# Patient Record
Sex: MALE | Race: BLACK OR AFRICAN AMERICAN | Employment: FULL TIME | URBAN - NONMETROPOLITAN AREA
[De-identification: names, ages, dates, MRNs, and addresses within clinical notes are randomized per-mention and may not be internally consistent; named-entity substitution may affect disease eponyms.]

---

## 2020-10-29 ENCOUNTER — APPOINTMENT (OUTPATIENT)
Dept: CT IMAGING | Age: 40
End: 2020-10-29
Attending: EMERGENCY MEDICINE

## 2020-10-29 ENCOUNTER — HOSPITAL ENCOUNTER (EMERGENCY)
Age: 40
Discharge: HOME | End: 2020-10-29
Attending: EMERGENCY MEDICINE

## 2020-10-29 ENCOUNTER — HOSPITAL ENCOUNTER (INPATIENT)
Age: 40
LOS: 2 days | Discharge: HOME | DRG: 440 | End: 2020-11-01
Attending: EMERGENCY MEDICINE | Admitting: HOSPITALIST

## 2020-10-29 ENCOUNTER — APPOINTMENT (OUTPATIENT)
Dept: GENERAL RADIOLOGY | Age: 40
End: 2020-10-29
Attending: EMERGENCY MEDICINE

## 2020-10-29 VITALS
OXYGEN SATURATION: 99 % | HEART RATE: 83 BPM | HEIGHT: 73 IN | RESPIRATION RATE: 16 BRPM | TEMPERATURE: 97.2 F | WEIGHT: 192 LBS | BODY MASS INDEX: 25.45 KG/M2 | SYSTOLIC BLOOD PRESSURE: 156 MMHG | DIASTOLIC BLOOD PRESSURE: 73 MMHG

## 2020-10-29 DIAGNOSIS — K86.1 ACUTE ON CHRONIC PANCREATITIS (HCC): ICD-10-CM

## 2020-10-29 DIAGNOSIS — K85.20 ALCOHOL-INDUCED ACUTE PANCREATITIS, UNSPECIFIED COMPLICATION STATUS: Primary | ICD-10-CM

## 2020-10-29 DIAGNOSIS — K85.90 ACUTE ON CHRONIC PANCREATITIS (HCC): ICD-10-CM

## 2020-10-29 PROBLEM — F10.10 ALCOHOL ABUSE: Status: ACTIVE | Noted: 2020-10-29

## 2020-10-29 LAB
ALBUMIN SERPL-MCNC: 4.2 G/DL (ref 3.5–5)
ALBUMIN/GLOB SERPL: 1 {RATIO} (ref 1.1–2.2)
ALP SERPL-CCNC: 49 U/L (ref 45–117)
ALT SERPL-CCNC: 44 U/L (ref 12–78)
AMPHET UR QL SCN: NEGATIVE
ANION GAP SERPL CALC-SCNC: 13 MMOL/L (ref 5–15)
APPEARANCE UR: CLEAR
AST SERPL W P-5'-P-CCNC: 104 U/L (ref 15–37)
ATRIAL RATE: 75 BPM
BARBITURATES UR QL SCN: NEGATIVE
BASOPHILS # BLD: 0 K/UL (ref 0–0.2)
BASOPHILS NFR BLD: 1 % (ref 0–2.5)
BENZODIAZ UR QL: NEGATIVE
BILIRUB SERPL-MCNC: 0.6 MG/DL (ref 0.2–1)
BILIRUB UR QL: NEGATIVE
BUN SERPL-MCNC: 10 MG/DL (ref 6–20)
BUN/CREAT SERPL: 9 (ref 12–20)
CA-I BLD-MCNC: 8.8 MG/DL (ref 8.5–10.1)
CALCULATED P AXIS, ECG09: 68 DEGREES
CALCULATED R AXIS, ECG10: 71 DEGREES
CALCULATED T AXIS, ECG11: 48 DEGREES
CANNABINOIDS UR QL SCN: NEGATIVE
CHLORIDE SERPL-SCNC: 97 MMOL/L (ref 97–108)
CO2 SERPL-SCNC: 23 MMOL/L (ref 21–32)
COCAINE UR QL SCN: NEGATIVE
COLOR UR: NORMAL
CREAT SERPL-MCNC: 1.07 MG/DL (ref 0.7–1.3)
DIAGNOSIS, 93000: NORMAL
DRUG SCRN COMMENT,DRGCM: NORMAL
ECSTASY, ECST: NEGATIVE
EOSINOPHIL # BLD: 0.1 K/UL (ref 0–0.7)
EOSINOPHIL NFR BLD: 2 % (ref 0.9–2.9)
ERYTHROCYTE [DISTWIDTH] IN BLOOD BY AUTOMATED COUNT: 13.5 % (ref 11.5–14.5)
ETHANOL SERPL-MCNC: 68 MG/DL
GLOBULIN SER CALC-MCNC: 4.4 G/DL (ref 2–4)
GLUCOSE SERPL-MCNC: 122 MG/DL (ref 65–100)
GLUCOSE UR STRIP.AUTO-MCNC: NEGATIVE MG/DL
HCT VFR BLD AUTO: 40.6 % (ref 41–53)
HGB BLD-MCNC: 13.7 G/DL (ref 13.5–17.5)
HGB UR QL STRIP: NEGATIVE
KETONES UR QL STRIP.AUTO: NEGATIVE MG/DL
LEUKOCYTE ESTERASE UR QL STRIP.AUTO: NEGATIVE
LIPASE SERPL-CCNC: 517 U/L (ref 73–393)
LYMPHOCYTES # BLD: 1.3 K/UL (ref 1–4.8)
LYMPHOCYTES NFR BLD: 28 % (ref 20.5–51.1)
MAGNESIUM SERPL-MCNC: 1.8 MG/DL (ref 1.6–2.4)
MCH RBC QN AUTO: 31.7 PG (ref 31–34)
MCHC RBC AUTO-ENTMCNC: 33.7 G/DL (ref 31–36)
MCV RBC AUTO: 94.2 FL (ref 80–100)
METHADONE UR QL: NEGATIVE
MONOCYTES # BLD: 0.3 K/UL (ref 0.2–2.4)
MONOCYTES NFR BLD: 7 % (ref 1.7–9.3)
NEUTS SEG # BLD: 3 K/UL (ref 1.8–7.7)
NEUTS SEG NFR BLD: 62 % (ref 42–75)
NITRITE UR QL STRIP.AUTO: NEGATIVE
NRBC # BLD: 0 K/UL
NRBC BLD-RTO: 10 PER 100 WBC
OPIATES UR QL: NEGATIVE
P-R INTERVAL, ECG05: 163 MS
PCP UR QL: NEGATIVE
PH UR STRIP: 5.5 [PH] (ref 5–8)
PLATELET # BLD AUTO: 282 K/UL
PMV BLD AUTO: 7.8 FL (ref 6.5–11.5)
POTASSIUM SERPL-SCNC: 3.8 MMOL/L (ref 3.5–5.1)
PROT SERPL-MCNC: 8.6 G/DL (ref 6.4–8.2)
PROT UR STRIP-MCNC: NEGATIVE MG/DL
Q-T INTERVAL, ECG07: 408 MS
QRS DURATION, ECG06: 98 MS
QTC CALCULATION (BEZET), ECG08: 441 MS
RBC # BLD AUTO: 4.31 M/UL (ref 4.5–5.9)
SODIUM SERPL-SCNC: 133 MMOL/L (ref 136–145)
SP GR UR REFRACTOMETRY: 1.01 (ref 1–1.03)
TROPONIN I SERPL-MCNC: <0.05 NG/ML
UROBILINOGEN UR QL STRIP.AUTO: 0.2 EU/DL (ref 0.2–1)
VENTRICULAR RATE, ECG03: 70 BPM
WBC # BLD AUTO: 4.8 K/UL (ref 4.4–11.3)

## 2020-10-29 PROCEDURE — 74011000250 HC RX REV CODE- 250: Performed by: NURSE PRACTITIONER

## 2020-10-29 PROCEDURE — 99284 EMERGENCY DEPT VISIT MOD MDM: CPT

## 2020-10-29 PROCEDURE — 80307 DRUG TEST PRSMV CHEM ANLYZR: CPT

## 2020-10-29 PROCEDURE — 74011250637 HC RX REV CODE- 250/637: Performed by: EMERGENCY MEDICINE

## 2020-10-29 PROCEDURE — 74011250637 HC RX REV CODE- 250/637: Performed by: NURSE PRACTITIONER

## 2020-10-29 PROCEDURE — 96375 TX/PRO/DX INJ NEW DRUG ADDON: CPT

## 2020-10-29 PROCEDURE — 99218 HC RM OBSERVATION: CPT

## 2020-10-29 PROCEDURE — 36415 COLL VENOUS BLD VENIPUNCTURE: CPT

## 2020-10-29 PROCEDURE — 83735 ASSAY OF MAGNESIUM: CPT

## 2020-10-29 PROCEDURE — 93005 ELECTROCARDIOGRAM TRACING: CPT

## 2020-10-29 PROCEDURE — 74176 CT ABD & PELVIS W/O CONTRAST: CPT

## 2020-10-29 PROCEDURE — 71045 X-RAY EXAM CHEST 1 VIEW: CPT

## 2020-10-29 PROCEDURE — 74011250636 HC RX REV CODE- 250/636: Performed by: NURSE PRACTITIONER

## 2020-10-29 PROCEDURE — 74011250636 HC RX REV CODE- 250/636: Performed by: EMERGENCY MEDICINE

## 2020-10-29 PROCEDURE — 84484 ASSAY OF TROPONIN QUANT: CPT

## 2020-10-29 PROCEDURE — 96374 THER/PROPH/DIAG INJ IV PUSH: CPT

## 2020-10-29 PROCEDURE — 83690 ASSAY OF LIPASE: CPT

## 2020-10-29 PROCEDURE — 81003 URINALYSIS AUTO W/O SCOPE: CPT

## 2020-10-29 PROCEDURE — 96376 TX/PRO/DX INJ SAME DRUG ADON: CPT

## 2020-10-29 PROCEDURE — 85025 COMPLETE CBC W/AUTO DIFF WBC: CPT

## 2020-10-29 PROCEDURE — 99283 EMERGENCY DEPT VISIT LOW MDM: CPT

## 2020-10-29 PROCEDURE — 80053 COMPREHEN METABOLIC PANEL: CPT

## 2020-10-29 RX ORDER — KETOROLAC TROMETHAMINE 30 MG/ML
30 INJECTION, SOLUTION INTRAMUSCULAR; INTRAVENOUS
Status: COMPLETED | OUTPATIENT
Start: 2020-10-29 | End: 2020-10-29

## 2020-10-29 RX ORDER — ONDANSETRON 4 MG/1
4 TABLET, ORALLY DISINTEGRATING ORAL
Status: COMPLETED | OUTPATIENT
Start: 2020-10-29 | End: 2020-10-29

## 2020-10-29 RX ORDER — MORPHINE SULFATE 2 MG/ML
1 INJECTION, SOLUTION INTRAMUSCULAR; INTRAVENOUS
Status: DISCONTINUED | OUTPATIENT
Start: 2020-10-29 | End: 2020-10-30

## 2020-10-29 RX ORDER — IBUPROFEN 200 MG
1 TABLET ORAL DAILY
Status: DISCONTINUED | OUTPATIENT
Start: 2020-10-29 | End: 2020-11-01 | Stop reason: HOSPADM

## 2020-10-29 RX ORDER — HYDROMORPHONE HYDROCHLORIDE 2 MG/ML
1 INJECTION, SOLUTION INTRAMUSCULAR; INTRAVENOUS; SUBCUTANEOUS
Status: COMPLETED | OUTPATIENT
Start: 2020-10-29 | End: 2020-10-29

## 2020-10-29 RX ORDER — ONDANSETRON 2 MG/ML
4 INJECTION INTRAMUSCULAR; INTRAVENOUS
Status: DISCONTINUED | OUTPATIENT
Start: 2020-10-29 | End: 2020-11-01 | Stop reason: HOSPADM

## 2020-10-29 RX ORDER — ONDANSETRON 2 MG/ML
4 INJECTION INTRAMUSCULAR; INTRAVENOUS
Status: COMPLETED | OUTPATIENT
Start: 2020-10-29 | End: 2020-10-29

## 2020-10-29 RX ORDER — ONDANSETRON 4 MG/1
4 TABLET, ORALLY DISINTEGRATING ORAL
Qty: 6 TAB | Refills: 0 | Status: SHIPPED | OUTPATIENT
Start: 2020-10-29 | End: 2020-10-31

## 2020-10-29 RX ADMIN — ONDANSETRON 4 MG: 2 INJECTION INTRAMUSCULAR; INTRAVENOUS at 16:55

## 2020-10-29 RX ADMIN — ONDANSETRON 4 MG: 4 TABLET, ORALLY DISINTEGRATING ORAL at 13:18

## 2020-10-29 RX ADMIN — SODIUM CHLORIDE 1000 ML: 9 INJECTION, SOLUTION INTRAVENOUS at 11:48

## 2020-10-29 RX ADMIN — MORPHINE SULFATE 1 MG: 2 INJECTION, SOLUTION INTRAMUSCULAR; INTRAVENOUS at 17:01

## 2020-10-29 RX ADMIN — SODIUM CHLORIDE 1000 ML: 9 INJECTION, SOLUTION INTRAVENOUS at 18:01

## 2020-10-29 RX ADMIN — HYDROMORPHONE HYDROCHLORIDE 1 MG: 2 INJECTION, SOLUTION INTRAMUSCULAR; INTRAVENOUS; SUBCUTANEOUS at 11:21

## 2020-10-29 RX ADMIN — FAMOTIDINE 20 MG: 10 INJECTION INTRAVENOUS at 17:03

## 2020-10-29 RX ADMIN — KETOROLAC TROMETHAMINE 30 MG: 30 INJECTION, SOLUTION INTRAMUSCULAR at 18:17

## 2020-10-29 RX ADMIN — MORPHINE SULFATE 1 MG: 2 INJECTION, SOLUTION INTRAMUSCULAR; INTRAVENOUS at 23:05

## 2020-10-29 RX ADMIN — ONDANSETRON 4 MG: 2 INJECTION INTRAMUSCULAR; INTRAVENOUS at 11:45

## 2020-10-29 RX ADMIN — FOLIC ACID: 5 INJECTION, SOLUTION INTRAMUSCULAR; INTRAVENOUS; SUBCUTANEOUS at 17:07

## 2020-10-29 NOTE — ED PROVIDER NOTES
Patient is a 45 y.o. male [address] who presents to the ER with a chief complaint of abdominal pain right upper quadrant and upper quadrant. X1 week. . States that it is triggered by drinking alcohol. . And recently drank 3 or 4 beers. No recent trauma. Associated symptoms nausea. No diarrhea or  Recent trauma. Scale is 5/10. Pain Intermittent . Chief Complaint   Patient presents with    Abdominal Pain        Past Medical History:   Diagnosis Date    Pancreatitis        No past surgical history on file. No family history on file. Social History     Socioeconomic History    Marital status: SINGLE     Spouse name: Not on file    Number of children: Not on file    Years of education: Not on file    Highest education level: Not on file   Occupational History    Not on file   Social Needs    Financial resource strain: Not on file    Food insecurity     Worry: Not on file     Inability: Not on file    Transportation needs     Medical: Not on file     Non-medical: Not on file   Tobacco Use    Smoking status: Current Every Day Smoker     Packs/day: 1.00    Smokeless tobacco: Never Used   Substance and Sexual Activity    Alcohol use:  Yes     Alcohol/week: 3.0 standard drinks     Types: 3 Cans of beer per week    Drug use: Never    Sexual activity: Not on file   Lifestyle    Physical activity     Days per week: Not on file     Minutes per session: Not on file    Stress: Not on file   Relationships    Social connections     Talks on phone: Not on file     Gets together: Not on file     Attends Hindu service: Not on file     Active member of club or organization: Not on file     Attends meetings of clubs or organizations: Not on file     Relationship status: Not on file    Intimate partner violence     Fear of current or ex partner: Not on file     Emotionally abused: Not on file     Physically abused: Not on file     Forced sexual activity: Not on file   Other Topics Concern    Not on file   Social History Narrative    Not on file         ALLERGIES: Patient has no known allergies. Review of Systems   Constitutional: Negative. HENT: Negative. Eyes: Negative. Respiratory: Negative. Cardiovascular: Negative. Gastrointestinal: Positive for abdominal pain. Uppe right and upper l lef pain tenderness   Endocrine: Negative. Genitourinary: Negative. Musculoskeletal: Negative. Skin: Negative. Allergic/Immunologic: Negative. Neurological: Negative. Hematological: Negative. Psychiatric/Behavioral: Negative. All other systems reviewed and are negative. Vitals:    10/29/20 1108 10/29/20 1113   Pulse: 83    Resp: 18    Temp: 97.2 °F (36.2 °C)    SpO2: 99% 99%   Weight: 87.1 kg (192 lb)    Height: 6' 1\" (1.854 m)             Physical Exam  Abdominal:      Palpations: Abdomen is soft. Tenderness: There is abdominal tenderness in the right upper quadrant and right lower quadrant. MDM  Number of Diagnoses or Management Options  Alcohol-induced acute pancreatitis, unspecified complication status:      Amount and/or Complexity of Data Reviewed  Clinical lab tests: reviewed  Tests in the radiology section of CPT®: reviewed    Risk of Complications, Morbidity, and/or Mortality  Presenting problems: low  Diagnostic procedures: low  General comments: Critical care time 30 mins    Patient refused to be hospitalized stating if he get worse he would return. The pro and con discussed to no avail patient wanted to leave and go home. Patient Progress  Patient progress: improved    ED Course as of Oct 29 1241   Thu Oct 29, 2020   1240 Labs and Ct Scan results reviewed with patient.     [PG]      ED Course User Index  [PG] Verl Fabry, MD       EKG    Date/Time: 10/29/2020 12:48 PM  Performed by: Verl Fabry, MD  Authorized by: Verl Fabry, MD     ECG reviewed by ED Physician in the absence of a cardiologist: yes Previous ECG:     Previous ECG:  Unavailable  Interpretation:     Interpretation: normal    Rate:     ECG rate assessment: normal    Rhythm:     Rhythm: sinus rhythm    Ectopy:     Ectopy: none    Conduction:     Conduction: normal    ST segments:     ST segments:  Normal  T waves:     T waves: normal    Comments:      Normal Sinus rhyhm     none          . ICD-10-CM ICD-9-CM    1.  Alcohol-induced acute pancreatitis, unspecified complication status  H74.73 577.0     chronic

## 2020-10-29 NOTE — ED PROVIDER NOTES
HPI   Patient is a 45 y.o. male [address] who presents to the ER with a chief complaint of abdominal pain for 2 weeks. Patient just left ED 10 mins ago only to return. While in ED he was diagnosed with pancreatitis and  he was advised to stay but refused. He was extremely apologetic stating he should have stayed. No shortness of breath or chest pains. some nausea not vomiting. Chief Complaint   Patient presents with    Abdominal Pain    Nausea    Vomiting      Past Medical History:   Diagnosis Date    Pancreatitis        No past surgical history on file. No family history on file. Social History     Socioeconomic History    Marital status: SINGLE     Spouse name: Not on file    Number of children: Not on file    Years of education: Not on file    Highest education level: Not on file   Occupational History    Not on file   Social Needs    Financial resource strain: Not on file    Food insecurity     Worry: Not on file     Inability: Not on file    Transportation needs     Medical: Not on file     Non-medical: Not on file   Tobacco Use    Smoking status: Current Every Day Smoker     Packs/day: 1.00    Smokeless tobacco: Never Used   Substance and Sexual Activity    Alcohol use:  Yes     Alcohol/week: 3.0 standard drinks     Types: 3 Cans of beer per week    Drug use: Never    Sexual activity: Not on file   Lifestyle    Physical activity     Days per week: Not on file     Minutes per session: Not on file    Stress: Not on file   Relationships    Social connections     Talks on phone: Not on file     Gets together: Not on file     Attends Latter-day service: Not on file     Active member of club or organization: Not on file     Attends meetings of clubs or organizations: Not on file     Relationship status: Not on file    Intimate partner violence     Fear of current or ex partner: Not on file     Emotionally abused: Not on file     Physically abused: Not on file Forced sexual activity: Not on file   Other Topics Concern    Not on file   Social History Narrative    Not on file         ALLERGIES: Patient has no known allergies. Review of Systems   Constitutional: Negative. HENT: Negative. Eyes: Negative. Respiratory: Negative. Cardiovascular: Negative. Gastrointestinal: Positive for abdominal pain and nausea. Endocrine: Negative. Genitourinary: Negative. Musculoskeletal: Negative. Skin: Negative. Allergic/Immunologic: Negative. Neurological: Negative. Hematological: Negative. Psychiatric/Behavioral: Negative. All other systems reviewed and are negative. Vitals:    10/29/20 1454   BP: 122/83   Pulse: 72   Resp: 18   Temp: 97.9 °F (36.6 °C)   SpO2: 98%   Weight: 87.5 kg (193 lb)   Height: 6' 1\" (1.854 m)            Physical Exam  Vitals signs and nursing note reviewed. Constitutional:       Appearance: Normal appearance. He is normal weight. HENT:      Head: Normocephalic. Nose: Nose normal.   Eyes:      Pupils: Pupils are equal, round, and reactive to light. Neck:      Musculoskeletal: Normal range of motion. Cardiovascular:      Rate and Rhythm: Normal rate. Pulses: Normal pulses. Pulmonary:      Effort: Pulmonary effort is normal.   Abdominal:      General: Abdomen is flat. Bowel sounds are normal.      Palpations: Abdomen is soft. Tenderness: There is abdominal tenderness in the right upper quadrant and left upper quadrant. Musculoskeletal: Normal range of motion. Skin:     General: Skin is warm. Capillary Refill: Capillary refill takes less than 2 seconds. Neurological:      General: No focal deficit present. Mental Status: He is alert and oriented to person, place, and time. Psychiatric:         Mood and Affect: Mood is anxious.           MDM  Number of Diagnoses or Management Options  Alcohol-induced acute pancreatitis, unspecified complication status:   Diagnosis management comments: Pancreatitis       Amount and/or Complexity of Data Reviewed  Clinical lab tests: reviewed  Tests in the radiology section of CPT®: reviewed  Discuss the patient with other providers: (Hospitalist Dr. David Hilliard at 1500)    Risk of Complications, Morbidity, and/or Mortality  Presenting problems: moderate  Diagnostic procedures: moderate  Management options: moderate  General comments: Critical time 30 mins IV NS. Plan is to admitt patient for observation with IV fluid, NPO and pain medication.     Patient Progress  Patient progress: stable         Procedures

## 2020-10-29 NOTE — ROUTINE PROCESS
Report given at this time. The patient being admitted for pancreatitis at this time. Report to Hiram Velasquez at this time.

## 2020-10-29 NOTE — ED TRIAGE NOTES
Pt was just here and discharged home per his request. States that he did not make it home before he started vomiting again, so he came back.

## 2020-10-29 NOTE — H&P
Hospitalist           History and Physical Note: 10/29/2020      Subjective:   Patient is a 61-year-old man with history of chronic alcoholic pancreatitis who presents to emergency department today with complaints of abdominal pain. He describes the pain as tenderness to the right upper quadrant which started this morning. The patient reports that he had several bottles of beer yesterday evening while hanging out with his friends. He first presented to the emergency department this morning for abdominal pain and due to elevated lipase level  he was advised to stay for pain management and monitoring but the patient decided to leave. When he got home his pain persisted so he returned back to the emergency department this evening. CT of abdomen showed no evidence of renal disease or bladder calculi but did show resolution of previously visualized pancreatitis and no other acute abdominal abnormalities. Urine drug screen was negative and lipase level elevated at 517. He will be placed in observation overnight for IV rehydration, pain management and bowel rest.    Problem List:  Patient Active Problem List   Diagnosis Code    Acute on chronic pancreatitis (HCC) K85.90, K86.1    Alcohol abuse F10.10         Medications reviewed  Current Facility-Administered Medications   Medication Dose Route Frequency    sodium chloride 0.9 % bolus infusion 1,000 mL  1,000 mL IntraVENous ONCE    promethazine (PHENERGAN) 25 mg in 0.9% sodium chloride 50 mL IVPB  25 mg IntraVENous NOW    0.9% sodium chloride 1,000 mL with mvi, adult no. 4 with vit K 10 mL, thiamine 269 mg, folic acid 1 mg, magnesium sulfate 1 g infusion   IntraVENous ONCE    ondansetron (ZOFRAN) injection 4 mg  4 mg IntraVENous Q6H PRN    morphine injection 1 mg  1 mg IntraVENous Q6H PRN     Current Outpatient Medications   Medication Sig    ondansetron (Zofran ODT) 4 mg disintegrating tablet Take 1 Tab by mouth every eight (8) hours as needed for Nausea or Nausea or Vomiting for up to 2 days. Review of Systems:   Review of Systems   Constitutional: Negative for chills, diaphoresis, fever, malaise/fatigue and weight loss. HENT: Negative for ear discharge, ear pain, hearing loss, nosebleeds, sinus pain and tinnitus. Respiratory: Negative for cough, hemoptysis, sputum production, shortness of breath and wheezing. Cardiovascular: Negative for chest pain, palpitations, orthopnea, claudication and leg swelling. Gastrointestinal: Positive for abdominal pain. Negative for constipation, diarrhea, heartburn, nausea and vomiting. Genitourinary: Negative for dysuria, flank pain, frequency, hematuria and urgency. Musculoskeletal: Negative for back pain, falls, joint pain, myalgias and neck pain. Neurological: Negative for dizziness, tingling, focal weakness, seizures, weakness and headaches. Psychiatric/Behavioral: Negative for depression, hallucinations, memory loss, substance abuse and suicidal ideas. The patient is not nervous/anxious. Objective:   Physical Exam  Constitutional:       General: He is not in acute distress. Appearance: Normal appearance. He is normal weight. HENT:      Head: Normocephalic and atraumatic. Mouth/Throat:      Mouth: Mucous membranes are moist.      Pharynx: Oropharynx is clear. Eyes:      Pupils: Pupils are equal, round, and reactive to light. Neck:      Musculoskeletal: No neck rigidity. Cardiovascular:      Rate and Rhythm: Normal rate and regular rhythm. Heart sounds: No murmur. No friction rub. No gallop. Pulmonary:      Effort: Pulmonary effort is normal. No respiratory distress. Breath sounds: Normal breath sounds. No wheezing or rales. Abdominal:      General: Bowel sounds are normal. There is no distension. Palpations: Abdomen is soft. Tenderness: There is abdominal tenderness. There is no rebound.       Comments: Tenderness to right upper abdomen with palpation   Musculoskeletal: Normal range of motion. General: No swelling, tenderness, deformity or signs of injury. Skin:     General: Skin is warm and dry. Coloration: Skin is not jaundiced. Findings: No bruising, erythema or rash. Neurological:      General: No focal deficit present. Mental Status: He is alert and oriented to person, place, and time. Sensory: No sensory deficit. Motor: No weakness. Gait: Gait normal.   Psychiatric:         Mood and Affect: Mood normal.         Behavior: Behavior normal.         Thought Content: Thought content normal.          Visit Vitals  /83 (BP 1 Location: Right arm, BP Patient Position: At rest)   Pulse 72   Temp 97.9 °F (36.6 °C)   Resp 18   Ht 6' 1\" (1.854 m)   Wt 87.5 kg (193 lb)   SpO2 98%   BMI 25.46 kg/m²        Data Review:       Recent Days:  Recent Labs     10/29/20  1150   WBC 4.8   HGB 13.7   HCT 40.6*        Recent Labs     10/29/20  1150   *   K 3.8   CL 97   CO2 23   *   BUN 10   CREA 1.07   CA 8.8   MG 1.8   ALB 4.2   TBILI 0.6   ALT 44       No past surgical history      Past Medical History:   Diagnosis Date    Pancreatitis         Social History     Tobacco Use    Smoking status: Current Every Day Smoker     Packs/day: 1.00    Smokeless tobacco: Never Used   Substance Use Topics    Alcohol use: Yes     Alcohol/week: 3.0 standard drinks     Types: 3 Cans of beer per week    Drug use: Never      Assessment/Plan     1. Acute on chronic alcoholic pancreatitis  Secondary to alcohol abuse  Lipase level is 517  CT of abdomen shows no acute abdominal findings  Will give a 1000 mL normal saline bolus and multivitamin infusion thereafter. N.p.o. with ice chips for bowel rest and repeat lipase level in a.m. Zofran as needed for nausea, pepcid IV for symptomatic GERD and morphine as needed for pain.     2.  Alcohol abuse  He has been counseled about importance of complete abstinence  Start CIWA protocol and start multivitamin infusion (Banana bag)    3. Nicotine dependence  He has been counseled about importance of cessation  Start nicotine patch. CBC, BMP, lipase level in a.m. SCDs for DVT prophylaxis  Full code  Place in observation      Care Plan discussed with: Patient/Family    Total time spent with patient: 45 minutes.     Adrienne Barahona, BRIAN

## 2020-10-29 NOTE — ED TRIAGE NOTES
Pt c/o RUQ and LUQ abd pain with nausea. States that the pain feels like it did when he had pancreatitis.

## 2020-10-29 NOTE — ROUTINE PROCESS
TRANSFER - IN REPORT: 
 
Verbal report received fromMARIANNE NULL R.N.(name) on Marcello Hogue  being received from ED(unit) for routine progression of care Report consisted of patients Situation, Background, Assessment and  
Recommendations(SBAR). Information from the following report(s) SBAR was reviewed with the receiving nurse. Opportunity for questions and clarification was provided. Assessment completed upon patients arrival to unit and care assumed.

## 2020-10-30 PROBLEM — K85.90 PANCREATITIS: Status: ACTIVE | Noted: 2020-10-30

## 2020-10-30 LAB
ALBUMIN SERPL-MCNC: 3.7 G/DL (ref 3.5–5)
ALBUMIN/GLOB SERPL: 1 {RATIO} (ref 1.1–2.2)
ALP SERPL-CCNC: 42 U/L (ref 45–117)
ALT SERPL-CCNC: 34 U/L (ref 12–78)
ANION GAP SERPL CALC-SCNC: 10 MMOL/L (ref 5–15)
AST SERPL W P-5'-P-CCNC: 76 U/L (ref 15–37)
BASOPHILS # BLD: 0 K/UL (ref 0–0.2)
BASOPHILS NFR BLD: 0 % (ref 0–2.5)
BILIRUB SERPL-MCNC: 1.3 MG/DL (ref 0.2–1)
BUN SERPL-MCNC: 10 MG/DL (ref 6–20)
BUN/CREAT SERPL: 10 (ref 12–20)
CA-I BLD-MCNC: 8.4 MG/DL (ref 8.5–10.1)
CHLORIDE SERPL-SCNC: 99 MMOL/L (ref 97–108)
CO2 SERPL-SCNC: 26 MMOL/L (ref 21–32)
CREAT SERPL-MCNC: 0.96 MG/DL (ref 0.7–1.3)
EOSINOPHIL # BLD: 0.1 K/UL (ref 0–0.7)
EOSINOPHIL NFR BLD: 2 % (ref 0.9–2.9)
ERYTHROCYTE [DISTWIDTH] IN BLOOD BY AUTOMATED COUNT: 13.1 % (ref 11.5–14.5)
EST. AVERAGE GLUCOSE BLD GHB EST-MCNC: 120 MG/DL
GLOBULIN SER CALC-MCNC: 3.8 G/DL (ref 2–4)
GLUCOSE SERPL-MCNC: 122 MG/DL (ref 65–100)
HBA1C MFR BLD: 5.8 % (ref 4–5.6)
HCT VFR BLD AUTO: 39 % (ref 41–53)
HGB BLD-MCNC: 13.2 G/DL (ref 13.5–17.5)
LIPASE SERPL-CCNC: 2182 U/L (ref 73–393)
LYMPHOCYTES # BLD: 1 K/UL (ref 1–4.8)
LYMPHOCYTES NFR BLD: 12 % (ref 20.5–51.1)
MCH RBC QN AUTO: 31.8 PG (ref 31–34)
MCHC RBC AUTO-ENTMCNC: 33.7 G/DL (ref 31–36)
MCV RBC AUTO: 94.3 FL (ref 80–100)
MONOCYTES # BLD: 0.6 K/UL (ref 0.2–2.4)
MONOCYTES NFR BLD: 7 % (ref 1.7–9.3)
NEUTS SEG # BLD: 6.5 K/UL (ref 1.8–7.7)
NEUTS SEG NFR BLD: 79 % (ref 42–75)
NRBC # BLD: 0 K/UL
NRBC BLD-RTO: 0 PER 100 WBC
PLATELET # BLD AUTO: 247 K/UL
PMV BLD AUTO: 8 FL (ref 6.5–11.5)
POTASSIUM SERPL-SCNC: 3.4 MMOL/L (ref 3.5–5.1)
PROT SERPL-MCNC: 7.5 G/DL (ref 6.4–8.2)
RBC # BLD AUTO: 4.14 M/UL (ref 4.5–5.9)
SODIUM SERPL-SCNC: 135 MMOL/L (ref 136–145)
WBC # BLD AUTO: 8.2 K/UL (ref 4.4–11.3)

## 2020-10-30 PROCEDURE — 74011250636 HC RX REV CODE- 250/636: Performed by: HOSPITALIST

## 2020-10-30 PROCEDURE — 36415 COLL VENOUS BLD VENIPUNCTURE: CPT

## 2020-10-30 PROCEDURE — 96376 TX/PRO/DX INJ SAME DRUG ADON: CPT

## 2020-10-30 PROCEDURE — 74011250637 HC RX REV CODE- 250/637: Performed by: NURSE PRACTITIONER

## 2020-10-30 PROCEDURE — 85025 COMPLETE CBC W/AUTO DIFF WBC: CPT

## 2020-10-30 PROCEDURE — 99221 1ST HOSP IP/OBS SF/LOW 40: CPT | Performed by: INTERNAL MEDICINE

## 2020-10-30 PROCEDURE — 99218 HC RM OBSERVATION: CPT

## 2020-10-30 PROCEDURE — 83690 ASSAY OF LIPASE: CPT

## 2020-10-30 PROCEDURE — 74011250636 HC RX REV CODE- 250/636: Performed by: NURSE PRACTITIONER

## 2020-10-30 PROCEDURE — 80053 COMPREHEN METABOLIC PANEL: CPT

## 2020-10-30 PROCEDURE — 74011000250 HC RX REV CODE- 250: Performed by: NURSE PRACTITIONER

## 2020-10-30 PROCEDURE — 83036 HEMOGLOBIN GLYCOSYLATED A1C: CPT

## 2020-10-30 PROCEDURE — 65270000029 HC RM PRIVATE

## 2020-10-30 RX ORDER — KETOROLAC TROMETHAMINE 30 MG/ML
30 INJECTION, SOLUTION INTRAMUSCULAR; INTRAVENOUS
Status: DISCONTINUED | OUTPATIENT
Start: 2020-10-30 | End: 2020-10-30

## 2020-10-30 RX ORDER — KETOROLAC TROMETHAMINE 30 MG/ML
30 INJECTION, SOLUTION INTRAMUSCULAR; INTRAVENOUS EVERY 6 HOURS
Status: COMPLETED | OUTPATIENT
Start: 2020-10-30 | End: 2020-11-01

## 2020-10-30 RX ORDER — SODIUM CHLORIDE AND POTASSIUM CHLORIDE .9; .15 G/100ML; G/100ML
SOLUTION INTRAVENOUS CONTINUOUS
Status: DISCONTINUED | OUTPATIENT
Start: 2020-10-30 | End: 2020-10-31

## 2020-10-30 RX ORDER — MORPHINE SULFATE 2 MG/ML
1 INJECTION, SOLUTION INTRAMUSCULAR; INTRAVENOUS
Status: DISCONTINUED | OUTPATIENT
Start: 2020-10-30 | End: 2020-10-31

## 2020-10-30 RX ORDER — MAGNESIUM SULFATE HEPTAHYDRATE 40 MG/ML
2 INJECTION, SOLUTION INTRAVENOUS ONCE
Status: COMPLETED | OUTPATIENT
Start: 2020-10-30 | End: 2020-10-30

## 2020-10-30 RX ADMIN — MORPHINE SULFATE 1 MG: 2 INJECTION, SOLUTION INTRAMUSCULAR; INTRAVENOUS at 05:06

## 2020-10-30 RX ADMIN — POTASSIUM CHLORIDE AND SODIUM CHLORIDE: 900; 150 INJECTION, SOLUTION INTRAVENOUS at 15:08

## 2020-10-30 RX ADMIN — FAMOTIDINE 20 MG: 10 INJECTION INTRAVENOUS at 21:03

## 2020-10-30 RX ADMIN — KETOROLAC TROMETHAMINE 30 MG: 30 INJECTION, SOLUTION INTRAMUSCULAR at 17:09

## 2020-10-30 RX ADMIN — FAMOTIDINE 20 MG: 10 INJECTION INTRAVENOUS at 08:06

## 2020-10-30 RX ADMIN — MAGNESIUM SULFATE 2 G: 2 INJECTION INTRAVENOUS at 08:05

## 2020-10-30 RX ADMIN — POTASSIUM CHLORIDE AND SODIUM CHLORIDE: 900; 150 INJECTION, SOLUTION INTRAVENOUS at 08:05

## 2020-10-30 RX ADMIN — MORPHINE SULFATE 1 MG: 2 INJECTION, SOLUTION INTRAMUSCULAR; INTRAVENOUS at 21:02

## 2020-10-30 RX ADMIN — KETOROLAC TROMETHAMINE 30 MG: 30 INJECTION, SOLUTION INTRAMUSCULAR at 12:07

## 2020-10-30 RX ADMIN — ONDANSETRON 4 MG: 2 INJECTION INTRAMUSCULAR; INTRAVENOUS at 12:09

## 2020-10-30 RX ADMIN — POTASSIUM CHLORIDE AND SODIUM CHLORIDE: 900; 150 INJECTION, SOLUTION INTRAVENOUS at 22:16

## 2020-10-30 RX ADMIN — KETOROLAC TROMETHAMINE 30 MG: 30 INJECTION, SOLUTION INTRAMUSCULAR at 08:06

## 2020-10-30 RX ADMIN — KETOROLAC TROMETHAMINE 30 MG: 30 INJECTION, SOLUTION INTRAMUSCULAR at 23:22

## 2020-10-30 NOTE — PROGRESS NOTES
Progress Note  Date:10/30/2020       Room:202/01  Patient Migue Hernandez     Date of Birth:[birth year]     Age:40 y.o. Subjective    Patient is a 41-year-old man with history of chronic alcoholic pancreatitis who presents to emergency department today with complaints of abdominal pain. He describes the pain as tenderness to the right upper quadrant which started this morning. The patient reports that he had several bottles of beer yesterday evening while hanging out with his friends. He first presented to the emergency department this morning for abdominal pain and due to elevated lipase level  he was advised to stay for pain management and monitoring but the patient decided to leave. When he got home his pain persisted so he returned back to the emergency department this evening. CT of abdomen showed no evidence of renal disease or bladder calculi but did show resolution of previously visualized pancreatitis and no other acute abdominal abnormalities. Urine drug screen was negative and lipase level elevated at 517. He will be placed in observation overnight for IV rehydration, pain management and bowel rest.    Patient was seen and evaluated resting in bed continues to require significant pain medications were 3 doses of morphine were administered patient says it is not lasting very long and states he had better pain relief with Toradol. Patient lipase level increased from 517 to 2182. Continues to be n.p.o. Afebrile    Review of Systems   Constitutional: Negative for chills, diaphoresis, fatigue and fever. HENT: Negative for congestion, ear pain, postnasal drip, sinus pain and sore throat. Eyes: Negative for pain, discharge and redness. Respiratory: Negative for cough, shortness of breath and wheezing. Cardiovascular: Negative for chest pain and palpitations. Gastrointestinal: Positive for abdominal pain. Negative for constipation, diarrhea, nausea and vomiting.    Genitourinary: Negative for dysuria, flank pain, frequency and urgency. Musculoskeletal: Negative for arthralgias and myalgias. Neurological: Negative for dizziness, weakness and headaches. Psychiatric/Behavioral: Negative for agitation and hallucinations. The patient is not nervous/anxious. Objective           Vitals Last 24 Hours:  Patient Vitals for the past 24 hrs:   Temp Pulse Resp BP SpO2   10/30/20 0759  (!) 56      10/30/20 0734 97.8 °F (36.6 °C) (!) 56 20 131/80 98 %   10/30/20 0500 99.5 °F (37.5 °C) 62 16 (!) 147/67 99 %   10/29/20 2000 98.6 °F (37 °C) 65 16 (!) 141/92 99 %   10/29/20 1813 98.1 °F (36.7 °C) 77 20 (!) 150/91 99 %   10/29/20 1454 97.9 °F (36.6 °C) 72 18 122/83 98 %        I/O (24Hr): Intake/Output Summary (Last 24 hours) at 10/30/2020 0932  Last data filed at 10/30/2020 0000  Gross per 24 hour   Intake    Output 300 ml   Net -300 ml       Physical Exam:  General: Alert, cooperative, no distress, appears stated age. Head:  Normocephalic, without obvious abnormality, atraumatic. Eyes:  Conjunctivae/corneas clear. Pupils equal, round, reactive to light. Extraocular movements intact. Lungs:  Clear to auscultation bilaterally. no wheeze, rales, crackles, rhonchi   Chest wall: No tenderness or deformity. Heart:  Regular rate and rhythm, S1, S2 normal, no murmur, click, rub or gallop. Abdomen:  Soft, epigastrictenderness . Bowel sounds normal. No masses,  No organomegaly. no guarding or rebound   Extremities: Extremities normal, atraumatic, no cyanosis or edema. Pulses: 2+ and symmetric all extremities. Skin: Skin color, texture, turgor normal. No rashes or lesions  Neurologic: Awake, Alert, oriented.  No obvious gross sensory or motor deficits      Medications           Current Facility-Administered Medications   Medication Dose Route Frequency    0.9% sodium chloride with KCl 20 mEq/L infusion   IntraVENous CONTINUOUS    ketorolac (TORADOL) injection 30 mg  30 mg IntraVENous Q6H    morphine injection 1 mg  1 mg IntraVENous Q4H PRN    ondansetron (ZOFRAN) injection 4 mg  4 mg IntraVENous Q6H PRN    famotidine (PF) (PEPCID) 20 mg in 0.9% sodium chloride 10 mL injection  20 mg IntraVENous Q12H    nicotine (NICODERM CQ) 14 mg/24 hr patch 1 Patch  1 Patch TransDERmal DAILY         Allergies         Patient has no known allergies. Labs/Imaging/Diagnostics      Labs:  Recent Results (from the past 48 hour(s))   URINALYSIS W/ RFLX MICROSCOPIC    Collection Time: 10/29/20 11:30 AM   Result Value Ref Range    Color Yellow/Straw      Appearance Clear Clear      Specific gravity 1.015 1.003 - 1.030      pH (UA) 5.5 5.0 - 8.0      Protein Negative Negative mg/dL    Glucose Negative Negative mg/dL    Ketone Negative Negative mg/dL    Bilirubin Negative Negative      Blood Negative Negative      Urobilinogen 0.2 0.2 - 1.0 EU/dL    Nitrites Negative Negative      Leukocyte Esterase Negative Negative     DRUG SCREEN, URINE    Collection Time: 10/29/20 11:30 AM   Result Value Ref Range    AMPHETAMINES Negative Negative      BARBITURATES Negative Negative      BENZODIAZEPINES Negative Negative      COCAINE Negative Negative      ECSTASY, MDMA Negative Negative      METHADONE Negative Negative      OPIATES Negative Negative      PCP(PHENCYCLIDINE) Negative Negative      THC (TH-CANNABINOL) Negative Negative      Drug screen comment        This test is a screen for drugs of abuse in a medical setting only (i.e., they are unconfirmed results and as such must not be used for non-medical purposes, e.g.,employment testing, legal testing). Due to its inherent nature, false positive (FP) and false negative (FN) results may be obtained. Therefore, if necessary for medical care, recommend confirmation of positive findings by GC/MS.    CBC WITH AUTOMATED DIFF    Collection Time: 10/29/20 11:50 AM   Result Value Ref Range    WBC 4.8 4.4 - 11.3 K/uL    RBC 4.31 (L) 4.50 - 5.90 M/uL    HGB 13.7 13.5 - 17.5 g/dL    HCT 40.6 (L) 41 - 53 %    MCV 94.2 80 - 100 FL    MCH 31.7 31 - 34 PG    MCHC 33.7 31.0 - 36.0 g/dL    RDW 13.5 11.5 - 14.5 %    PLATELET 723 K/uL    MPV 7.8 6.5 - 11.5 FL    NRBC 10.0  WBC    ABSOLUTE NRBC 0.00 K/uL    NEUTROPHILS 62 42 - 75 %    LYMPHOCYTES 28 20.5 - 51.1 %    MONOCYTES 7 1.7 - 9.3 %    EOSINOPHILS 2 0.9 - 2.9 %    BASOPHILS 1 0.0 - 2.5 %    ABS. NEUTROPHILS 3.0 1.8 - 7.7 K/UL    ABS. LYMPHOCYTES 1.3 1.0 - 4.8 K/UL    ABS. MONOCYTES 0.3 0.2 - 2.4 K/UL    ABS. EOSINOPHILS 0.1 0.0 - 0.7 K/UL    ABS. BASOPHILS 0.0 0.0 - 0.2 K/UL   METABOLIC PANEL, COMPREHENSIVE    Collection Time: 10/29/20 11:50 AM   Result Value Ref Range    Sodium 133 (L) 136 - 145 mmol/L    Potassium 3.8 3.5 - 5.1 mmol/L    Chloride 97 97 - 108 mmol/L    CO2 23 21 - 32 mmol/L    Anion gap 13 5 - 15 mmol/L    Glucose 122 (H) 65 - 100 mg/dL    BUN 10 6 - 20 mg/dL    Creatinine 1.07 0.70 - 1.30 mg/dL    BUN/Creatinine ratio 9 (L) 12 - 20      GFR est AA >60 >60 ml/min/1.73m2    GFR est non-AA >60 >60 ml/min/1.73m2    Calcium 8.8 8.5 - 10.1 mg/dL    Bilirubin, total 0.6 0.2 - 1.0 mg/dL    AST (SGOT) 104 (H) 15 - 37 U/L    ALT (SGPT) 44 12 - 78 U/L    Alk.  phosphatase 49 45 - 117 U/L    Protein, total 8.6 (H) 6.4 - 8.2 g/dL    Albumin 4.2 3.5 - 5.0 g/dL    Globulin 4.4 (H) 2.0 - 4.0 g/dL    A-G Ratio 1.0 (L) 1.1 - 2.2     LIPASE    Collection Time: 10/29/20 11:50 AM   Result Value Ref Range    Lipase 517 (H) 73 - 393 U/L   MAGNESIUM    Collection Time: 10/29/20 11:50 AM   Result Value Ref Range    Magnesium 1.8 1.6 - 2.4 mg/dL   ETHYL ALCOHOL    Collection Time: 10/29/20 11:50 AM   Result Value Ref Range    ALCOHOL(ETHYL),SERUM 68 (H) <10 mg/dL   TROPONIN I    Collection Time: 10/29/20 11:50 AM   Result Value Ref Range    Troponin-I, Qt. <0.05 <0.05 ng/mL   EKG, 12 LEAD, INITIAL    Collection Time: 10/29/20 11:53 AM   Result Value Ref Range    Ventricular Rate 70 BPM    Atrial Rate 75 BPM    P-R Interval 163 ms    QRS Duration 98 ms Q-T Interval 408 ms    QTC Calculation (Bezet) 441 ms    Calculated P Axis 68 degrees    Calculated R Axis 71 degrees    Calculated T Axis 48 degrees    Diagnosis       Sinus arrhythmia    Confirmed by [facility] Ashvin DEE ([id number]) on 10/29/2020 5:09:18 PM     CBC WITH AUTOMATED DIFF    Collection Time: 10/30/20  5:24 AM   Result Value Ref Range    WBC 8.2 4.4 - 11.3 K/uL    RBC 4.14 (L) 4.50 - 5.90 M/uL    HGB 13.2 (L) 13.5 - 17.5 g/dL    HCT 39.0 (L) 41 - 53 %    MCV 94.3 80 - 100 FL    MCH 31.8 31 - 34 PG    MCHC 33.7 31.0 - 36.0 g/dL    RDW 13.1 11.5 - 14.5 %    PLATELET 707 K/uL    MPV 8.0 6.5 - 11.5 FL    NRBC 0.0  WBC    ABSOLUTE NRBC 0.00 K/uL    NEUTROPHILS 79 (H) 42 - 75 %    LYMPHOCYTES 12 (L) 20.5 - 51.1 %    MONOCYTES 7 1.7 - 9.3 %    EOSINOPHILS 2 0.9 - 2.9 %    BASOPHILS 0 0.0 - 2.5 %    ABS. NEUTROPHILS 6.5 1.8 - 7.7 K/UL    ABS. LYMPHOCYTES 1.0 1.0 - 4.8 K/UL    ABS. MONOCYTES 0.6 0.2 - 2.4 K/UL    ABS. EOSINOPHILS 0.1 0.0 - 0.7 K/UL    ABS. BASOPHILS 0.0 0.0 - 0.2 K/UL   METABOLIC PANEL, COMPREHENSIVE    Collection Time: 10/30/20  5:24 AM   Result Value Ref Range    Sodium 135 (L) 136 - 145 mmol/L    Potassium 3.4 (L) 3.5 - 5.1 mmol/L    Chloride 99 97 - 108 mmol/L    CO2 26 21 - 32 mmol/L    Anion gap 10 5 - 15 mmol/L    Glucose 122 (H) 65 - 100 mg/dL    BUN 10 6 - 20 mg/dL    Creatinine 0.96 0.70 - 1.30 mg/dL    BUN/Creatinine ratio 10 (L) 12 - 20      GFR est AA >60 >60 ml/min/1.73m2    GFR est non-AA >60 >60 ml/min/1.73m2    Calcium 8.4 (L) 8.5 - 10.1 mg/dL    Bilirubin, total 1.3 (H) 0.2 - 1.0 mg/dL    AST (SGOT) 76 (H) 15 - 37 U/L    ALT (SGPT) 34 12 - 78 U/L    Alk.  phosphatase 42 (L) 45 - 117 U/L    Protein, total 7.5 6.4 - 8.2 g/dL    Albumin 3.7 3.5 - 5.0 g/dL    Globulin 3.8 2.0 - 4.0 g/dL    A-G Ratio 1.0 (L) 1.1 - 2.2     LIPASE    Collection Time: 10/30/20  5:24 AM   Result Value Ref Range    Lipase 2,182 (H) 73 - 393 U/L        Imaging:  Ct Abd Pelv Wo Cont    Result Date: 10/29/2020  Impression: No evidence of renal, ureteral or bladder calculi. No acute appendicitis. Resolution of previously visualized pancreatitis. No acute abdominal or pelvic process identified. Please see full report. Xr Chest Port    Result Date: 10/29/2020  Impression: The cardiomediastinal silhouette is appropriate for age, technique, and lung expansion. Pulmonary vasculature is not congested. The lungs are essentially clear. No effusion or pneumothorax is seen. Assessment//Plan           Problem List:  Hospital Problems  Never Reviewed          Codes Class Noted POA    Pancreatitis ICD-10-CM: K85.90  ICD-9-CM: 495.4  10/30/2020 Unknown        Acute on chronic pancreatitis [facility]) ICD-10-CM: K85.90, K86.1  ICD-9-CM: 856.0, 577.1  10/29/2020 Unknown        Alcohol abuse ICD-10-CM: F10.10  ICD-9-CM: 305.00  10/29/2020 Unknown            1.  Acute on chronic alcoholic pancreatitis  Secondary to alcohol abuse   Lipase level is 517  CT of abdomen shows no acute abdominal findings  -given  1000 mL normal saline bolus and multivitamin infusion thereafter.  - N.p.o. with ice chips for bowel rest   - Zofran as needed for nausea,  -Morphine 1 mg as needed   -Toradol 30 g IV  -GI consulted  -10/30: Patient lipase level increased from 517-2182 continues to have significant pain with morphine helping but only for short duration stated Toradol helped better so we will continue Toradol 30 g IV every 6 hours scheduled and continue morphine as needed continue n.p.o. change IV fluids to normal saline with 20 meq KCl at 150 cc an hour    2. Alcohol abuse  - He has been counseled about importance of complete abstinence  - Start CIWA protocol and start multivitamin infusion (Banana bag) x 1  -Continue oral multivitamin, thiamine, folate       3. Nicotine dependence  - He has been counseled about importance of cessation  -Continue nicotine patch.     4.  Hypokalemia  -Patient potassium level was 3.8 on admission decreased to 3.4 we will add 20 meq oral KCl to IV fluids to run at 150 cc an hour     5. Hypomagnesemia  -Magnesium level mildly low at 1.8 will give 2 g IV magnesium sulfate rider follow repeat magnesium level in a.m. tomorrow    GI prophylaxis  -IV Pepcid twice daily    DVT prophylaxis  -Lovenox    Full Code     Spent 30 minutes evaluting and coordinating patient care of which >50% was spent coordinating and counseling.        Electronically signed by Ez Hargrove MD on 10/30/2020 at 9:32 AM

## 2020-10-30 NOTE — PROGRESS NOTES
Care Management Interventions  Mode of Transport at Discharge: Self  Current Support Network: Own Home  Confirm Follow Up Transport: Self  Discharge Location  Discharge Placement: Home

## 2020-10-30 NOTE — CONSULTS
Gastroenterology Consult      Patient: Chai Reece MRN: [id number]  SSN: [id number]    YOB: 1980  Age: 45 y.o. Sex: male        Assessment:     1. Acute pancreatitis  -Secondary to alcohol abuse. 2.  Alcohol abuse    Plan:     1. Bowel rest.  Keep n.p.o. except for ice chips. 2.  Vigorous IV hydration  3. Control pain with IV analgesics until patient is able to tolerate oral  4. Antiemetic as needed  5. Continue to monitor lipase levels daily. 6.  Patient was counseled on the importance of him stopping use of all alcohol. Subjective:     Reason for consultation: Acute pancreatitis    History: 52-year-old male with history of cryptitis and chronic alcohol abuse who comes in with an episode of acute pancreatitis. Patient states his symptoms started yesterday morning with severe epigastric abdominal pain and nausea and vomiting. The patient states he had been drinking beer and liquor the previous night. Patient states he also had a complaint diarrhea at that time. Patient states this is his third episode of pancreatitis, related to his alcohol use. Patient presented to the emergency room with plans to admit him however patient initially refused and went home. Again had intractable nausea and vomiting so he returned to the emergency room and was subsequently admitted. He had a CT scan of the abdomen which showed improving evidence of pancreatitis from prior scan. Hospital Problems  Never Reviewed          Codes Class Noted POA    Pancreatitis ICD-10-CM: K85.90  ICD-9-CM: 173.3  10/30/2020 Unknown        Acute on chronic pancreatitis [facility]) ICD-10-CM: K85.90, K86.1  ICD-9-CM: 482.6, 577.1  10/29/2020 Unknown        Alcohol abuse ICD-10-CM: F10.10  ICD-9-CM: 305.00  10/29/2020 Unknown            Past Medical History:   Diagnosis Date    Pancreatitis      No past surgical history on file. No family history on file.   Social History     Tobacco Use    Smoking status: Current Every Day Smoker     Packs/day: 1.00    Smokeless tobacco: Never Used   Substance Use Topics    Alcohol use: Yes     Alcohol/week: 3.0 standard drinks     Types: 3 Cans of beer per week      Current Facility-Administered Medications   Medication Dose Route Frequency Provider Last Rate Last Dose    0.9% sodium chloride with KCl 20 mEq/L infusion   IntraVENous CONTINUOUS Bridgetna MD Agueda 150 mL/hr at 10/30/20 1508      ketorolac (TORADOL) injection 30 mg  30 mg IntraVENous Q6H Radu Morin MD   30 mg at 10/30/20 1207    morphine injection 1 mg  1 mg IntraVENous Q4H PRN Mandadi, Dorsie Nissen, MD        ondansetron (ZOFRAN) injection 4 mg  4 mg IntraVENous Q6H PRN Kathy Walters NP   4 mg at 10/30/20 1209    famotidine (PF) (PEPCID) 20 mg in 0.9% sodium chloride 10 mL injection  20 mg IntraVENous Q12H Kathy Walters NP   20 mg at 10/30/20 0806    nicotine (NICODERM CQ) 14 mg/24 hr patch 1 Patch  1 Patch TransDERmal DAILY Kathy Walters NP   1 Patch at 10/30/20 0806        No Known Allergies    Review of Systems:  Constitutional: No recent weight change, fever,fatigue, or loss of appetite. ENT/Mouth: No hearing loss, nose bleeds, sore throat, voice change, hoarseness, or difficulties with chewing and swallowing. Cardiovascular: No chest pain, palpitations, swelling of feet/ankles/hands. Respiratory: No chronic or frequent coughs, spitting up blood, shortness of breath, asthma, or wheezing. Gastrointestinal: Generalized abdominal pain,  (+)nausea, vomiting, constipation, (+) diarrhea, No rectal bleeding, or blood in stool. Genitourinary: No frequent urination, burning or painful urination, blood in urine. Musculoskeletal:  No joint pain, stiffness/swelling, weakness of muscles, muscle pain/cramp, or back pain. Integument:  No rash/itching, change in skin color.   Neurological: No dizziness/vertigo, loss of consciousness, numbness/tingling sensation, tremors, weakness in limbs, difficulty with balance, frequent or recurring headaches, memory loss or confusion. Psychiatric:  No nervousness, depression, hallucinations, paranoia or suspiciousness. Endocrine: No excessive thirst or urination, heat or cold intolerance. Hematologic/Lymphatic: No bleeding/bruising tendency, phlebitis, or past transfusion. Objective:     Vitals:    10/30/20 0759 10/30/20 1159 10/30/20 1600 10/30/20 1641   BP:  (!) 148/96  (!) 151/92   Pulse: (!) 56 70 (!) 59 (!) 59   Resp:  20  20   Temp:  98.1 °F (36.7 °C)  98 °F (36.7 °C)   SpO2:  99%  99%   Weight:       Height:            Physical Exam:  General: Alert, cooperative, no distress. Head:  Normocephalic, without obvious abnormality, atraumatic. Eyes:  Conjunctivae/corneas clear. Pupils equal, round, reactive to light. Extraocular movements intact. Lungs:  Clear to auscultation bilaterally. Chest wall: No tenderness or deformity. Heart:  Regular rate and rhythm, S1, S2 normal, no murmur, click, rub, or gallop. Abdomen:  Soft, epigastric abdominal tenderness. Bowel sounds normal. No masses. No organomegaly. Extremities: Extremities normal, atraumatic, no cyanosis or edema. Pulses: 2+ and symmetric all extremities. Skin: Skin color, texture, turgor normal. No rashes or lesions. Lymph nodes: Cervical, supraclavicular, and axillary nodes normal.  Neurologic: CNII-XII intact. Normal strength, sensation, and reflexes throughout.       CBC w/Diff Recent Labs     10/30/20  0524 10/29/20  1150   WBC 8.2 4.8   RBC 4.14* 4.31*   HGB 13.2* 13.7   HCT 39.0* 40.6*    282   GRANS 79* 62   LYMPH 12* 28   EOS 2 2        Chemistry Recent Labs     10/30/20  0524 10/29/20  1150   * 122*   * 133*   K 3.4* 3.8   CL 99 97   CO2 26 23   BUN 10 10   CREA 0.96 1.07   CA 8.4* 8.8   MG  --  1.8   AGAP 10 13   BUCR 10* 9*   AP 42* 49   TP 7.5 8.6*   ALB 3.7 4.2   GLOB 3.8 4.4*   AGRAT 1.0* 1.0*        Lactic Acid No results found for: LAC  No results for input(s): LAC in the last 72 hours. Micro  No results for input(s): SDES, CULT in the last 72 hours. No results for input(s): CULT in the last 72 hours. Liver Enzymes Protein, total   Date Value Ref Range Status   10/30/2020 7.5 6.4 - 8.2 g/dL Final     Albumin   Date Value Ref Range Status   10/30/2020 3.7 3.5 - 5.0 g/dL Final     Globulin   Date Value Ref Range Status   10/30/2020 3.8 2.0 - 4.0 g/dL Final     A-G Ratio   Date Value Ref Range Status   10/30/2020 1.0 (L) 1.1 - 2.2   Final     Alk. phosphatase   Date Value Ref Range Status   10/30/2020 42 (L) 45 - 117 U/L Final     Recent Labs     10/30/20  0524 10/29/20  1150   TP 7.5 8.6*   ALB 3.7 4.2   GLOB 3.8 4.4*   AGRAT 1.0* 1.0*   AP 42* 49          Cardiac Enzymes No results found for: CPK, CK, CKMMB, CKMB, RCK3, CKMBT, CKNDX, CKND1, ARIANNA, TROPT, TROIQ, ALVARO, TROPT, TNIPOC, BNP, BNPP     BNP No results found for: BNP, BNPP, XBNPT     Coagulation No results for input(s): PTP, INR, APTT, INREXT in the last 72 hours. Thyroid  No results found for: T4, T3U, TSH, TSHEXT       Lipid Panel No results found for: CHOL, CHOLPOCT, CHOLX, CHLST, CHOLV, 763453, HDL, HDLP, LDL, LDLC, DLDLP, 803464, VLDLC, VLDL, TGLX, TRIGL, TRIGP, TGLPOCT, CHHD, CHHDX     Urinalysis Lab Results   Component Value Date/Time    Color Yellow/Straw 10/29/2020 11:30 AM    Appearance Clear 10/29/2020 11:30 AM    Specific gravity 1.015 10/29/2020 11:30 AM    pH (UA) 5.5 10/29/2020 11:30 AM    Protein Negative 10/29/2020 11:30 AM    Glucose Negative 10/29/2020 11:30 AM    Ketone Negative 10/29/2020 11:30 AM    Bilirubin Negative 10/29/2020 11:30 AM    Urobilinogen 0.2 10/29/2020 11:30 AM    Nitrites Negative 10/29/2020 11:30 AM    Leukocyte Esterase Negative 10/29/2020 11:30 AM        XR (Most Recent). CXR reviewed by me and compared with previous CXR Results from Hospital Encounter encounter on 10/29/20   XR CHEST PORT    Narrative 1 view      Impression Impression:  The cardiomediastinal silhouette is appropriate for age, technique,  and lung expansion. Pulmonary vasculature is not congested. The lungs are  essentially clear. No effusion or pneumothorax is seen. CT (Most Recent) Results from Hospital Encounter encounter on 10/29/20   CT ABD PELV WO CONT    Narrative History is abdominal pain. Comparison is with the abdominal pelvic CT of 10/22/2019. Dose reduction: All CT scans at this facility are performed using dose reduction  optimization techniques as appropriate to a performed exam including the  following: Automated exposure control, adjustments of the mA and/or kV according  to patient size, or use of iterative reconstruction technique. Serial axial images were obtained from the lung bases to the symphysis pubis at  5 mm intervals without contrast. Coronal and sagittal reformatted images are  reviewed. Lung, bone and soft tissue windows are evaluated. The liver, spleen, pancreas, gallbladder, adrenal glands, kidneys and visualized  bowel loops appear unremarkable. The previously visualized pancreatitis has  resolved. There are no renal or ureteral calculi. There is no hydronephrosis or  hydroureter. The appendix is identified and appears unremarkable. There is mild  stool burden in the colon. No bladder calculi are identified. The pelvic structures are within normal  limits. There is no diverticulosis or diverticulitis. There is no free air, free  fluid or adenopathy. The osseous structures are within normal limits for the  patient's age. Impression Impression: No evidence of renal, ureteral or bladder calculi. No acute  appendicitis. Resolution of previously visualized pancreatitis. No acute  abdominal or pelvic process identified. Please see full report.                Anais Farnsworth MD  10/30/2020  4:59 PM.

## 2020-10-30 NOTE — ROUTINE PROCESS
Bedside shift change report given to eliel pedersen (oncoming nurse) by Homero Phillips r.n.(offgoing nurse). Report included the following information SBAR.

## 2020-10-31 ENCOUNTER — APPOINTMENT (OUTPATIENT)
Dept: CT IMAGING | Age: 40
DRG: 440 | End: 2020-10-31
Attending: HOSPITALIST

## 2020-10-31 LAB
ANION GAP SERPL CALC-SCNC: 10 MMOL/L (ref 5–15)
BASOPHILS # BLD: 0 K/UL (ref 0–0.2)
BASOPHILS NFR BLD: 0 % (ref 0–2.5)
BUN SERPL-MCNC: 9 MG/DL (ref 6–20)
BUN/CREAT SERPL: 11 (ref 12–20)
CA-I BLD-MCNC: 8.3 MG/DL (ref 8.5–10.1)
CHLORIDE SERPL-SCNC: 98 MMOL/L (ref 97–108)
CO2 SERPL-SCNC: 24 MMOL/L (ref 21–32)
CREAT SERPL-MCNC: 0.83 MG/DL (ref 0.7–1.3)
EOSINOPHIL # BLD: 0.2 K/UL (ref 0–0.7)
EOSINOPHIL NFR BLD: 3 % (ref 0.9–2.9)
ERYTHROCYTE [DISTWIDTH] IN BLOOD BY AUTOMATED COUNT: 13 % (ref 11.5–14.5)
GLUCOSE SERPL-MCNC: 109 MG/DL (ref 65–100)
HCT VFR BLD AUTO: 37.3 % (ref 41–53)
HGB BLD-MCNC: 12.8 G/DL (ref 13.5–17.5)
LIPASE SERPL-CCNC: 2654 U/L (ref 73–393)
LYMPHOCYTES # BLD: 0.7 K/UL (ref 1–4.8)
LYMPHOCYTES NFR BLD: 8 % (ref 20.5–51.1)
MAGNESIUM SERPL-MCNC: 1.7 MG/DL (ref 1.6–2.4)
MCH RBC QN AUTO: 32.1 PG (ref 31–34)
MCHC RBC AUTO-ENTMCNC: 34.3 G/DL (ref 31–36)
MCV RBC AUTO: 93.6 FL (ref 80–100)
MONOCYTES # BLD: 0.5 K/UL (ref 0.2–2.4)
MONOCYTES NFR BLD: 6 % (ref 1.7–9.3)
NEUTS SEG # BLD: 7.4 K/UL (ref 1.8–7.7)
NEUTS SEG NFR BLD: 83 % (ref 42–75)
NRBC # BLD: 0 K/UL
NRBC BLD-RTO: 0 PER 100 WBC
PLATELET # BLD AUTO: 216 K/UL
PMV BLD AUTO: 8.2 FL (ref 6.5–11.5)
POTASSIUM SERPL-SCNC: 3.8 MMOL/L (ref 3.5–5.1)
RBC # BLD AUTO: 3.99 M/UL (ref 4.5–5.9)
SODIUM SERPL-SCNC: 132 MMOL/L (ref 136–145)
WBC # BLD AUTO: 8.8 K/UL (ref 4.4–11.3)

## 2020-10-31 PROCEDURE — 83735 ASSAY OF MAGNESIUM: CPT

## 2020-10-31 PROCEDURE — 74011000250 HC RX REV CODE- 250: Performed by: NURSE PRACTITIONER

## 2020-10-31 PROCEDURE — 74011000636 HC RX REV CODE- 636: Performed by: HOSPITALIST

## 2020-10-31 PROCEDURE — 80048 BASIC METABOLIC PNL TOTAL CA: CPT

## 2020-10-31 PROCEDURE — 74177 CT ABD & PELVIS W/CONTRAST: CPT

## 2020-10-31 PROCEDURE — 85025 COMPLETE CBC W/AUTO DIFF WBC: CPT

## 2020-10-31 PROCEDURE — 74011250637 HC RX REV CODE- 250/637: Performed by: HOSPITALIST

## 2020-10-31 PROCEDURE — 74011250636 HC RX REV CODE- 250/636: Performed by: NURSE PRACTITIONER

## 2020-10-31 PROCEDURE — 36415 COLL VENOUS BLD VENIPUNCTURE: CPT

## 2020-10-31 PROCEDURE — 65270000029 HC RM PRIVATE

## 2020-10-31 PROCEDURE — 74011250636 HC RX REV CODE- 250/636: Performed by: HOSPITALIST

## 2020-10-31 PROCEDURE — 83690 ASSAY OF LIPASE: CPT

## 2020-10-31 PROCEDURE — 74011250637 HC RX REV CODE- 250/637: Performed by: NURSE PRACTITIONER

## 2020-10-31 RX ORDER — HYDROMORPHONE HYDROCHLORIDE 2 MG/ML
1 INJECTION, SOLUTION INTRAMUSCULAR; INTRAVENOUS; SUBCUTANEOUS
Status: DISCONTINUED | OUTPATIENT
Start: 2020-10-31 | End: 2020-11-01

## 2020-10-31 RX ORDER — ENOXAPARIN SODIUM 100 MG/ML
40 INJECTION SUBCUTANEOUS EVERY 24 HOURS
Status: DISCONTINUED | OUTPATIENT
Start: 2020-10-31 | End: 2020-11-01 | Stop reason: HOSPADM

## 2020-10-31 RX ORDER — SODIUM CHLORIDE, SODIUM LACTATE, POTASSIUM CHLORIDE, CALCIUM CHLORIDE 600; 310; 30; 20 MG/100ML; MG/100ML; MG/100ML; MG/100ML
200 INJECTION, SOLUTION INTRAVENOUS CONTINUOUS
Status: DISCONTINUED | OUTPATIENT
Start: 2020-10-31 | End: 2020-11-01

## 2020-10-31 RX ORDER — AMLODIPINE BESYLATE 5 MG/1
5 TABLET ORAL DAILY
Status: DISCONTINUED | OUTPATIENT
Start: 2020-10-31 | End: 2020-11-01 | Stop reason: HOSPADM

## 2020-10-31 RX ORDER — MAGNESIUM SULFATE HEPTAHYDRATE 40 MG/ML
2 INJECTION, SOLUTION INTRAVENOUS ONCE
Status: COMPLETED | OUTPATIENT
Start: 2020-10-31 | End: 2020-10-31

## 2020-10-31 RX ADMIN — HYDROMORPHONE HYDROCHLORIDE 1 MG: 2 INJECTION, SOLUTION INTRAMUSCULAR; INTRAVENOUS; SUBCUTANEOUS at 20:11

## 2020-10-31 RX ADMIN — IOPAMIDOL 100 ML: 755 INJECTION, SOLUTION INTRAVENOUS at 10:52

## 2020-10-31 RX ADMIN — FAMOTIDINE 20 MG: 10 INJECTION INTRAVENOUS at 20:10

## 2020-10-31 RX ADMIN — KETOROLAC TROMETHAMINE 30 MG: 30 INJECTION, SOLUTION INTRAMUSCULAR at 18:00

## 2020-10-31 RX ADMIN — POTASSIUM CHLORIDE AND SODIUM CHLORIDE: 900; 150 INJECTION, SOLUTION INTRAVENOUS at 05:35

## 2020-10-31 RX ADMIN — ENOXAPARIN SODIUM 40 MG: 80 INJECTION SUBCUTANEOUS at 08:44

## 2020-10-31 RX ADMIN — SODIUM CHLORIDE, POTASSIUM CHLORIDE, SODIUM LACTATE AND CALCIUM CHLORIDE 200 ML/HR: 600; 310; 30; 20 INJECTION, SOLUTION INTRAVENOUS at 16:00

## 2020-10-31 RX ADMIN — KETOROLAC TROMETHAMINE 30 MG: 30 INJECTION, SOLUTION INTRAMUSCULAR at 23:33

## 2020-10-31 RX ADMIN — AMLODIPINE BESYLATE 5 MG: 5 TABLET ORAL at 12:57

## 2020-10-31 RX ADMIN — FAMOTIDINE 20 MG: 10 INJECTION INTRAVENOUS at 08:43

## 2020-10-31 RX ADMIN — KETOROLAC TROMETHAMINE 30 MG: 30 INJECTION, SOLUTION INTRAMUSCULAR at 05:32

## 2020-10-31 RX ADMIN — DIATRIZOATE MEGLUMINE AND DIATRIZOATE SODIUM 30 ML: 660; 100 LIQUID ORAL; RECTAL at 09:15

## 2020-10-31 RX ADMIN — MAGNESIUM SULFATE 2 G: 2 INJECTION INTRAVENOUS at 08:43

## 2020-10-31 RX ADMIN — HYDROMORPHONE HYDROCHLORIDE 1 MG: 2 INJECTION, SOLUTION INTRAMUSCULAR; INTRAVENOUS; SUBCUTANEOUS at 16:03

## 2020-10-31 RX ADMIN — KETOROLAC TROMETHAMINE 30 MG: 30 INJECTION, SOLUTION INTRAMUSCULAR at 12:57

## 2020-10-31 RX ADMIN — HYDROMORPHONE HYDROCHLORIDE 1 MG: 2 INJECTION, SOLUTION INTRAMUSCULAR; INTRAVENOUS; SUBCUTANEOUS at 08:44

## 2020-10-31 RX ADMIN — SODIUM CHLORIDE, POTASSIUM CHLORIDE, SODIUM LACTATE AND CALCIUM CHLORIDE 200 ML/HR: 600; 310; 30; 20 INJECTION, SOLUTION INTRAVENOUS at 10:56

## 2020-10-31 NOTE — PROGRESS NOTES
Progress Note  Date:10/31/2020       Room:202/01  Дмитрий Wyatt Median     Date of Birth:[birth year]     Age:40 y.o. Subjective    Patient is a 81-year-old man with history of chronic alcoholic pancreatitis who presents to emergency department today with complaints of abdominal pain. He describes the pain as tenderness to the right upper quadrant which started this morning. The patient reports that he had several bottles of beer yesterday evening while hanging out with his friends. He first presented to the emergency department this morning for abdominal pain and due to elevated lipase level  he was advised to stay for pain management and monitoring but the patient decided to leave. When he got home his pain persisted so he returned back to the emergency department this evening. CT of abdomen showed no evidence of renal disease or bladder calculi but did show resolution of previously visualized pancreatitis and no other acute abdominal abnormalities. Urine drug screen was negative and lipase level elevated at 517. He will be placed in observation overnight for IV rehydration, pain management and bowel rest.    Patient seen and evaluated resting comfortably in bed no acute distress states that the Toradol helps for about 2 to 3 hours and then pain progressively worsens morphine is not helping and did get 1 dose overnight. Lipase levels worsening from 2182 to 2654. Patient continues to be n.p.o. except ice chips. Patient is afebrile stable vital signs with good O2 saturation on room air. Discussed with patient extensively about his pancreatitis and increasing lipase levels and will proceed with IV and oral contrast CT of his abdomen pelvis              Review of Systems   Constitutional: Negative for chills, diaphoresis, fatigue and fever. HENT: Negative for congestion, ear pain, postnasal drip, sinus pain and sore throat. Eyes: Negative for pain, discharge and redness.    Respiratory: Negative for cough, shortness of breath and wheezing. Cardiovascular: Negative for chest pain and palpitations. Gastrointestinal: Positive for abdominal pain. Negative for constipation, diarrhea, nausea and vomiting. Genitourinary: Negative for dysuria, flank pain, frequency and urgency. Musculoskeletal: Negative for arthralgias and myalgias. Neurological: Negative for dizziness, weakness and headaches. Psychiatric/Behavioral: Negative for agitation and hallucinations. The patient is not nervous/anxious. Objective           Vitals Last 24 Hours:  Patient Vitals for the past 24 hrs:   Temp Pulse Resp BP SpO2   10/31/20 0801 97.9 °F (36.6 °C) 72 19 (!) 151/102 98 %   10/31/20 0400 98.6 °F (37 °C) 70 16 (!) 145/98 99 %   10/30/20 2353  65      10/30/20 2000 98.2 °F (36.8 °C) 60 16 (!) 154/96 99 %   10/30/20 1641 98 °F (36.7 °C) (!) 59 20 (!) 151/92 99 %   10/30/20 1600  (!) 59      10/30/20 1159 98.1 °F (36.7 °C) 70 20 (!) 148/96 99 %        I/O (24Hr): Intake/Output Summary (Last 24 hours) at 10/31/2020 3006  Last data filed at 10/30/2020 1809  Gross per 24 hour   Intake 1010 ml   Output 200 ml   Net 810 ml       Physical Exam:  General: Alert, cooperative, no distress, appears stated age. Head:  Normocephalic, without obvious abnormality, atraumatic. Eyes:  Conjunctivae/corneas clear. Pupils equal, round, reactive to light. Extraocular movements intact. Lungs:  Clear to auscultation bilaterally. no wheeze, rales, crackles, rhonchi   Chest wall: No tenderness or deformity. Heart:  Regular rate and rhythm, S1, S2 normal, no murmur, click, rub or gallop. Abdomen:  Soft, epigastrictenderness . Bowel sounds normal. No masses,  No organomegaly. no guarding or rebound   Extremities: Extremities normal, atraumatic, no cyanosis or edema. Pulses: 2+ and symmetric all extremities. Skin: Skin color, texture, turgor normal. No rashes or lesions  Neurologic: Awake, Alert, oriented.  No obvious gross sensory or motor deficits      Medications           Current Facility-Administered Medications   Medication Dose Route Frequency    magnesium sulfate 2 g/50 ml IVPB (premix or compounded)  2 g IntraVENous ONCE    enoxaparin (LOVENOX) injection 40 mg  40 mg SubCUTAneous Q24H    lactated Ringers infusion  200 mL/hr IntraVENous CONTINUOUS    HYDROmorphone (DILAUDID) injection 1 mg  1 mg IntraVENous Q3H PRN    ketorolac (TORADOL) injection 30 mg  30 mg IntraVENous Q6H    ondansetron (ZOFRAN) injection 4 mg  4 mg IntraVENous Q6H PRN    famotidine (PF) (PEPCID) 20 mg in 0.9% sodium chloride 10 mL injection  20 mg IntraVENous Q12H    nicotine (NICODERM CQ) 14 mg/24 hr patch 1 Patch  1 Patch TransDERmal DAILY         Allergies         Patient has no known allergies.        Labs/Imaging/Diagnostics      Labs:  Recent Results (from the past 48 hour(s))   URINALYSIS W/ RFLX MICROSCOPIC    Collection Time: 10/29/20 11:30 AM   Result Value Ref Range    Color Yellow/Straw      Appearance Clear Clear      Specific gravity 1.015 1.003 - 1.030      pH (UA) 5.5 5.0 - 8.0      Protein Negative Negative mg/dL    Glucose Negative Negative mg/dL    Ketone Negative Negative mg/dL    Bilirubin Negative Negative      Blood Negative Negative      Urobilinogen 0.2 0.2 - 1.0 EU/dL    Nitrites Negative Negative      Leukocyte Esterase Negative Negative     DRUG SCREEN, URINE    Collection Time: 10/29/20 11:30 AM   Result Value Ref Range    AMPHETAMINES Negative Negative      BARBITURATES Negative Negative      BENZODIAZEPINES Negative Negative      COCAINE Negative Negative      ECSTASY, MDMA Negative Negative      METHADONE Negative Negative      OPIATES Negative Negative      PCP(PHENCYCLIDINE) Negative Negative      THC (TH-CANNABINOL) Negative Negative      Drug screen comment        This test is a screen for drugs of abuse in a medical setting only (i.e., they are unconfirmed results and as such must not be used for non-medical purposes, e.g.,employment testing, legal testing). Due to its inherent nature, false positive (FP) and false negative (FN) results may be obtained. Therefore, if necessary for medical care, recommend confirmation of positive findings by GC/MS. CBC WITH AUTOMATED DIFF    Collection Time: 10/29/20 11:50 AM   Result Value Ref Range    WBC 4.8 4.4 - 11.3 K/uL    RBC 4.31 (L) 4.50 - 5.90 M/uL    HGB 13.7 13.5 - 17.5 g/dL    HCT 40.6 (L) 41 - 53 %    MCV 94.2 80 - 100 FL    MCH 31.7 31 - 34 PG    MCHC 33.7 31.0 - 36.0 g/dL    RDW 13.5 11.5 - 14.5 %    PLATELET 609 K/uL    MPV 7.8 6.5 - 11.5 FL    NRBC 10.0  WBC    ABSOLUTE NRBC 0.00 K/uL    NEUTROPHILS 62 42 - 75 %    LYMPHOCYTES 28 20.5 - 51.1 %    MONOCYTES 7 1.7 - 9.3 %    EOSINOPHILS 2 0.9 - 2.9 %    BASOPHILS 1 0.0 - 2.5 %    ABS. NEUTROPHILS 3.0 1.8 - 7.7 K/UL    ABS. LYMPHOCYTES 1.3 1.0 - 4.8 K/UL    ABS. MONOCYTES 0.3 0.2 - 2.4 K/UL    ABS. EOSINOPHILS 0.1 0.0 - 0.7 K/UL    ABS. BASOPHILS 0.0 0.0 - 0.2 K/UL   METABOLIC PANEL, COMPREHENSIVE    Collection Time: 10/29/20 11:50 AM   Result Value Ref Range    Sodium 133 (L) 136 - 145 mmol/L    Potassium 3.8 3.5 - 5.1 mmol/L    Chloride 97 97 - 108 mmol/L    CO2 23 21 - 32 mmol/L    Anion gap 13 5 - 15 mmol/L    Glucose 122 (H) 65 - 100 mg/dL    BUN 10 6 - 20 mg/dL    Creatinine 1.07 0.70 - 1.30 mg/dL    BUN/Creatinine ratio 9 (L) 12 - 20      GFR est AA >60 >60 ml/min/1.73m2    GFR est non-AA >60 >60 ml/min/1.73m2    Calcium 8.8 8.5 - 10.1 mg/dL    Bilirubin, total 0.6 0.2 - 1.0 mg/dL    AST (SGOT) 104 (H) 15 - 37 U/L    ALT (SGPT) 44 12 - 78 U/L    Alk.  phosphatase 49 45 - 117 U/L    Protein, total 8.6 (H) 6.4 - 8.2 g/dL    Albumin 4.2 3.5 - 5.0 g/dL    Globulin 4.4 (H) 2.0 - 4.0 g/dL    A-G Ratio 1.0 (L) 1.1 - 2.2     LIPASE    Collection Time: 10/29/20 11:50 AM   Result Value Ref Range    Lipase 517 (H) 73 - 393 U/L   MAGNESIUM    Collection Time: 10/29/20 11:50 AM   Result Value Ref Range Magnesium 1.8 1.6 - 2.4 mg/dL   ETHYL ALCOHOL    Collection Time: 10/29/20 11:50 AM   Result Value Ref Range    ALCOHOL(ETHYL),SERUM 68 (H) <10 mg/dL   TROPONIN I    Collection Time: 10/29/20 11:50 AM   Result Value Ref Range    Troponin-I, Qt. <0.05 <0.05 ng/mL   EKG, 12 LEAD, INITIAL    Collection Time: 10/29/20 11:53 AM   Result Value Ref Range    Ventricular Rate 70 BPM    Atrial Rate 75 BPM    P-R Interval 163 ms    QRS Duration 98 ms    Q-T Interval 408 ms    QTC Calculation (Bezet) 441 ms    Calculated P Axis 68 degrees    Calculated R Axis 71 degrees    Calculated T Axis 48 degrees    Diagnosis       Sinus arrhythmia    Confirmed by [facility] OZIELAshvin DANG ([id number]) on 10/29/2020 5:09:18 PM     CBC WITH AUTOMATED DIFF    Collection Time: 10/30/20  5:24 AM   Result Value Ref Range    WBC 8.2 4.4 - 11.3 K/uL    RBC 4.14 (L) 4.50 - 5.90 M/uL    HGB 13.2 (L) 13.5 - 17.5 g/dL    HCT 39.0 (L) 41 - 53 %    MCV 94.3 80 - 100 FL    MCH 31.8 31 - 34 PG    MCHC 33.7 31.0 - 36.0 g/dL    RDW 13.1 11.5 - 14.5 %    PLATELET 142 K/uL    MPV 8.0 6.5 - 11.5 FL    NRBC 0.0  WBC    ABSOLUTE NRBC 0.00 K/uL    NEUTROPHILS 79 (H) 42 - 75 %    LYMPHOCYTES 12 (L) 20.5 - 51.1 %    MONOCYTES 7 1.7 - 9.3 %    EOSINOPHILS 2 0.9 - 2.9 %    BASOPHILS 0 0.0 - 2.5 %    ABS. NEUTROPHILS 6.5 1.8 - 7.7 K/UL    ABS. LYMPHOCYTES 1.0 1.0 - 4.8 K/UL    ABS. MONOCYTES 0.6 0.2 - 2.4 K/UL    ABS. EOSINOPHILS 0.1 0.0 - 0.7 K/UL    ABS.  BASOPHILS 0.0 0.0 - 0.2 K/UL   METABOLIC PANEL, COMPREHENSIVE    Collection Time: 10/30/20  5:24 AM   Result Value Ref Range    Sodium 135 (L) 136 - 145 mmol/L    Potassium 3.4 (L) 3.5 - 5.1 mmol/L    Chloride 99 97 - 108 mmol/L    CO2 26 21 - 32 mmol/L    Anion gap 10 5 - 15 mmol/L    Glucose 122 (H) 65 - 100 mg/dL    BUN 10 6 - 20 mg/dL    Creatinine 0.96 0.70 - 1.30 mg/dL    BUN/Creatinine ratio 10 (L) 12 - 20      GFR est AA >60 >60 ml/min/1.73m2    GFR est non-AA >60 >60 ml/min/1.73m2    Calcium 8.4 (L) 8.5 - 10.1 mg/dL    Bilirubin, total 1.3 (H) 0.2 - 1.0 mg/dL    AST (SGOT) 76 (H) 15 - 37 U/L    ALT (SGPT) 34 12 - 78 U/L    Alk. phosphatase 42 (L) 45 - 117 U/L    Protein, total 7.5 6.4 - 8.2 g/dL    Albumin 3.7 3.5 - 5.0 g/dL    Globulin 3.8 2.0 - 4.0 g/dL    A-G Ratio 1.0 (L) 1.1 - 2.2     LIPASE    Collection Time: 10/30/20  5:24 AM   Result Value Ref Range    Lipase 2,182 (H) 73 - 393 U/L   HEMOGLOBIN A1C WITH EAG    Collection Time: 10/30/20  5:24 AM   Result Value Ref Range    Hemoglobin A1c 5.8 (H) 4.0 - 5.6 %    Est. average glucose 120 mg/dL   LIPASE    Collection Time: 10/31/20  6:25 AM   Result Value Ref Range    Lipase 2,654 (H) 73 - 393 U/L   MAGNESIUM    Collection Time: 10/31/20  6:25 AM   Result Value Ref Range    Magnesium 1.7 1.6 - 2.4 mg/dL   CBC WITH AUTOMATED DIFF    Collection Time: 10/31/20  6:25 AM   Result Value Ref Range    WBC 8.8 4.4 - 11.3 K/uL    RBC 3.99 (L) 4.50 - 5.90 M/uL    HGB 12.8 (L) 13.5 - 17.5 g/dL    HCT 37.3 (L) 41 - 53 %    MCV 93.6 80 - 100 FL    MCH 32.1 31 - 34 PG    MCHC 34.3 31.0 - 36.0 g/dL    RDW 13.0 11.5 - 14.5 %    PLATELET 889 K/uL    MPV 8.2 6.5 - 11.5 FL    NRBC 0.0  WBC    ABSOLUTE NRBC 0.00 K/uL    NEUTROPHILS 83 (H) 42 - 75 %    LYMPHOCYTES 8 (L) 20.5 - 51.1 %    MONOCYTES 6 1.7 - 9.3 %    EOSINOPHILS 3 (H) 0.9 - 2.9 %    BASOPHILS 0 0.0 - 2.5 %    ABS. NEUTROPHILS 7.4 1.8 - 7.7 K/UL    ABS. LYMPHOCYTES 0.7 (L) 1.0 - 4.8 K/UL    ABS. MONOCYTES 0.5 0.2 - 2.4 K/UL    ABS. EOSINOPHILS 0.2 0.0 - 0.7 K/UL    ABS.  BASOPHILS 0.0 0.0 - 0.2 K/UL   METABOLIC PANEL, BASIC    Collection Time: 10/31/20  6:25 AM   Result Value Ref Range    Sodium 132 (L) 136 - 145 mmol/L    Potassium 3.8 3.5 - 5.1 mmol/L    Chloride 98 97 - 108 mmol/L    CO2 24 21 - 32 mmol/L    Anion gap 10 5 - 15 mmol/L    Glucose 109 (H) 65 - 100 mg/dL    BUN 9 6 - 20 mg/dL    Creatinine 0.83 0.70 - 1.30 mg/dL    BUN/Creatinine ratio 11 (L) 12 - 20      GFR est AA >60 >60 ml/min/1.73m2    GFR est non-AA >60 >60 ml/min/1.73m2    Calcium 8.3 (L) 8.5 - 10.1 mg/dL        Imaging:  Ct Abd Pelv Wo Cont    Result Date: 10/29/2020  Impression: No evidence of renal, ureteral or bladder calculi. No acute appendicitis. Resolution of previously visualized pancreatitis. No acute abdominal or pelvic process identified. Please see full report. Xr Chest Port    Result Date: 10/29/2020  Impression: The cardiomediastinal silhouette is appropriate for age, technique, and lung expansion. Pulmonary vasculature is not congested. The lungs are essentially clear. No effusion or pneumothorax is seen. Assessment//Plan           Problem List:  Hospital Problems  Never Reviewed          Codes Class Noted POA    Pancreatitis ICD-10-CM: K85.90  ICD-9-CM: 076.2  10/30/2020 Unknown        Acute on chronic pancreatitis [facility]) ICD-10-CM: K85.90, K86.1  ICD-9-CM: 042.9, 577.1  10/29/2020 Unknown        Alcohol abuse ICD-10-CM: F10.10  ICD-9-CM: 305.00  10/29/2020 Unknown            1.  Acute on chronic alcoholic pancreatitis  Secondary to alcohol abuse   Lipase level is 517  CT of abdomen shows no acute abdominal findings  -given  1000 mL normal saline bolus and multivitamin infusion thereafter.  - N.p.o. with ice chips for bowel rest   - Zofran as needed for nausea,  -Toradol 30 g IV  -GI consulted and recommendations appreciated  -10/30: Patient lipase level increased from 517-2182 continues to have significant pain with morphine helping but only for short duration stated Toradol helped better so we will continue Toradol 30 g IV every 6 hours scheduled and continue morphine as needed continue n.p.o. change IV fluids to normal saline with 20 meq KCl at 150 cc an hour  -10/31: Lipase level continues to increase from 2182 now to 2654 and patient continues to have pain we will continue scheduled Toradol and change morphine to Dilaudid 1 mg every 3 hours as needed and will change IV fluids to LR at 200 cc an hour      2.   Alcohol abuse  - He has been counseled about importance of complete abstinence  - Start CIWA protocol and start multivitamin infusion (Banana bag) x 1  -Continue oral multivitamin, thiamine, folate       3. Nicotine dependence  - He has been counseled about importance of cessation  -Continue nicotine patch. 4.  Hypokalemia  -Patient potassium level was 3.8 on admission decreased to 3.4 we will add 20 meq oral KCl to IV fluids to run at 150 cc an hour  -10/31 potassium has normalized at 3.8. DC KCl in IV fluids and start LR     5. Hypomagnesemia  -Magnesium level mildly low at 1.8 will give 2 g IV magnesium sulfate rider   -10/30 1 repeat magnesium level is at 1.7 so we will give additional 2 g IV magnesium sulfate rider  -Repeat mag level in a.m.  GI prophylaxis  -IV Pepcid twice daily    DVT prophylaxis  -Lovenox    Full Code     Spent 30 minutes evaluting and coordinating patient care of which >50% was spent coordinating and counseling.        Electronically signed by Charmayne Favia, MD on 10/31/2020 at 9:32 AM

## 2020-10-31 NOTE — PROGRESS NOTES
Reviewed CT scan of the abdomen pelvis with oral and IV contrast and shows presence of acute pancreatitis no complications of pseudocyst, necrosis noted on CT. Continue current management with IV fluids pain management and n.p.o. and follow repeat lipase level in a.m.

## 2020-10-31 NOTE — ROUTINE PROCESS
Bedside shift change report given to Genie Jarvis LPN by [facility] RN. Report included the following information SBAR.

## 2020-10-31 NOTE — PROGRESS NOTES
Problem: Falls - Risk of  Goal: *Absence of Falls  Description: Document Pina Bark Fall Risk and appropriate interventions in the flowsheet.   Outcome: Progressing Towards Goal  Note: Fall Risk Interventions:                                Problem: Patient Education: Go to Patient Education Activity  Goal: Patient/Family Education  Outcome: Progressing Towards Goal

## 2020-11-01 VITALS
SYSTOLIC BLOOD PRESSURE: 141 MMHG | RESPIRATION RATE: 18 BRPM | HEIGHT: 73 IN | DIASTOLIC BLOOD PRESSURE: 89 MMHG | HEART RATE: 97 BPM | OXYGEN SATURATION: 100 % | WEIGHT: 193 LBS | TEMPERATURE: 99.5 F | BODY MASS INDEX: 25.58 KG/M2

## 2020-11-01 LAB
ANION GAP SERPL CALC-SCNC: 9 MMOL/L (ref 5–15)
BASOPHILS # BLD: 0 K/UL (ref 0–0.2)
BASOPHILS NFR BLD: 0 % (ref 0–2.5)
BUN SERPL-MCNC: 8 MG/DL (ref 6–20)
BUN/CREAT SERPL: 10 (ref 12–20)
CA-I BLD-MCNC: 8.4 MG/DL (ref 8.5–10.1)
CHLORIDE SERPL-SCNC: 95 MMOL/L (ref 97–108)
CO2 SERPL-SCNC: 27 MMOL/L (ref 21–32)
CREAT SERPL-MCNC: 0.81 MG/DL (ref 0.7–1.3)
EOSINOPHIL # BLD: 0.3 K/UL (ref 0–0.7)
EOSINOPHIL NFR BLD: 3 % (ref 0.9–2.9)
ERYTHROCYTE [DISTWIDTH] IN BLOOD BY AUTOMATED COUNT: 13.1 % (ref 11.5–14.5)
GLUCOSE SERPL-MCNC: 93 MG/DL (ref 65–100)
HCT VFR BLD AUTO: 36.5 % (ref 41–53)
HGB BLD-MCNC: 12.7 G/DL (ref 13.5–17.5)
LIPASE SERPL-CCNC: 1055 U/L (ref 73–393)
LIPASE SERPL-CCNC: 825 U/L (ref 73–393)
LYMPHOCYTES # BLD: 1 K/UL (ref 1–4.8)
LYMPHOCYTES NFR BLD: 13 % (ref 20.5–51.1)
MAGNESIUM SERPL-MCNC: 1.8 MG/DL (ref 1.6–2.4)
MCH RBC QN AUTO: 32.4 PG (ref 31–34)
MCHC RBC AUTO-ENTMCNC: 34.7 G/DL (ref 31–36)
MCV RBC AUTO: 93.4 FL (ref 80–100)
MONOCYTES # BLD: 0.7 K/UL (ref 0.2–2.4)
MONOCYTES NFR BLD: 9 % (ref 1.7–9.3)
NEUTS SEG # BLD: 5.5 K/UL (ref 1.8–7.7)
NEUTS SEG NFR BLD: 75 % (ref 42–75)
NRBC # BLD: 0 K/UL
NRBC BLD-RTO: 0 PER 100 WBC
PLATELET # BLD AUTO: 218 K/UL
PMV BLD AUTO: 8 FL (ref 6.5–11.5)
POTASSIUM SERPL-SCNC: 3.4 MMOL/L (ref 3.5–5.1)
RBC # BLD AUTO: 3.91 M/UL (ref 4.5–5.9)
SODIUM SERPL-SCNC: 131 MMOL/L (ref 136–145)
WBC # BLD AUTO: 7.5 K/UL (ref 4.4–11.3)

## 2020-11-01 PROCEDURE — 74011000250 HC RX REV CODE- 250: Performed by: NURSE PRACTITIONER

## 2020-11-01 PROCEDURE — 74011250637 HC RX REV CODE- 250/637: Performed by: HOSPITALIST

## 2020-11-01 PROCEDURE — 85025 COMPLETE CBC W/AUTO DIFF WBC: CPT

## 2020-11-01 PROCEDURE — 36415 COLL VENOUS BLD VENIPUNCTURE: CPT

## 2020-11-01 PROCEDURE — 74011250636 HC RX REV CODE- 250/636: Performed by: NURSE PRACTITIONER

## 2020-11-01 PROCEDURE — 80048 BASIC METABOLIC PNL TOTAL CA: CPT

## 2020-11-01 PROCEDURE — 74011250636 HC RX REV CODE- 250/636: Performed by: HOSPITALIST

## 2020-11-01 PROCEDURE — 74011250637 HC RX REV CODE- 250/637: Performed by: NURSE PRACTITIONER

## 2020-11-01 PROCEDURE — 83735 ASSAY OF MAGNESIUM: CPT

## 2020-11-01 PROCEDURE — 83690 ASSAY OF LIPASE: CPT

## 2020-11-01 RX ORDER — TRAMADOL HYDROCHLORIDE 50 MG/1
50 TABLET ORAL
Qty: 12 TAB | Refills: 0 | Status: SHIPPED | OUTPATIENT
Start: 2020-11-01 | End: 2020-11-04

## 2020-11-01 RX ORDER — SODIUM CHLORIDE AND POTASSIUM CHLORIDE .9; .15 G/100ML; G/100ML
SOLUTION INTRAVENOUS CONTINUOUS
Status: DISCONTINUED | OUTPATIENT
Start: 2020-11-01 | End: 2020-11-01

## 2020-11-01 RX ORDER — KETOROLAC TROMETHAMINE 30 MG/ML
30 INJECTION, SOLUTION INTRAMUSCULAR; INTRAVENOUS
Status: DISCONTINUED | OUTPATIENT
Start: 2020-11-01 | End: 2020-11-01 | Stop reason: HOSPADM

## 2020-11-01 RX ORDER — LANOLIN ALCOHOL/MO/W.PET/CERES
400 CREAM (GRAM) TOPICAL 2 TIMES DAILY
Status: DISCONTINUED | OUTPATIENT
Start: 2020-11-01 | End: 2020-11-01 | Stop reason: HOSPADM

## 2020-11-01 RX ORDER — HYDROMORPHONE HYDROCHLORIDE 2 MG/ML
1 INJECTION, SOLUTION INTRAMUSCULAR; INTRAVENOUS; SUBCUTANEOUS
Status: DISCONTINUED | OUTPATIENT
Start: 2020-11-01 | End: 2020-11-01 | Stop reason: HOSPADM

## 2020-11-01 RX ORDER — IBUPROFEN 200 MG
1 TABLET ORAL DAILY
Qty: 30 PATCH | Refills: 0 | Status: SHIPPED | OUTPATIENT
Start: 2020-11-02 | End: 2020-12-02

## 2020-11-01 RX ORDER — AMLODIPINE BESYLATE 5 MG/1
5 TABLET ORAL DAILY
Qty: 30 TAB | Refills: 0 | Status: SHIPPED | OUTPATIENT
Start: 2020-11-02

## 2020-11-01 RX ORDER — LANOLIN ALCOHOL/MO/W.PET/CERES
400 CREAM (GRAM) TOPICAL DAILY
Qty: 30 TAB | Refills: 0 | Status: SHIPPED | OUTPATIENT
Start: 2020-11-01

## 2020-11-01 RX ORDER — POTASSIUM CHLORIDE 20 MEQ/1
40 TABLET, EXTENDED RELEASE ORAL DAILY
Status: COMPLETED | OUTPATIENT
Start: 2020-11-01 | End: 2020-11-01

## 2020-11-01 RX ADMIN — HYDROMORPHONE HYDROCHLORIDE 1 MG: 2 INJECTION, SOLUTION INTRAMUSCULAR; INTRAVENOUS; SUBCUTANEOUS at 03:19

## 2020-11-01 RX ADMIN — MAGNESIUM OXIDE 400 MG: 400 TABLET ORAL at 08:15

## 2020-11-01 RX ADMIN — KETOROLAC TROMETHAMINE 30 MG: 30 INJECTION, SOLUTION INTRAMUSCULAR at 05:56

## 2020-11-01 RX ADMIN — POTASSIUM CHLORIDE 40 MEQ: 1500 TABLET, EXTENDED RELEASE ORAL at 08:15

## 2020-11-01 RX ADMIN — AMLODIPINE BESYLATE 5 MG: 5 TABLET ORAL at 08:15

## 2020-11-01 RX ADMIN — ENOXAPARIN SODIUM 40 MG: 80 INJECTION SUBCUTANEOUS at 08:15

## 2020-11-01 RX ADMIN — FAMOTIDINE 20 MG: 10 INJECTION INTRAVENOUS at 08:15

## 2020-11-01 NOTE — PROGRESS NOTES
Problem: Falls - Risk of  Goal: *Absence of Falls  Description: Document Lissa Malik Fall Risk and appropriate interventions in the flowsheet.   Outcome: Progressing Towards Goal  Note: Fall Risk Interventions:                                Problem: Patient Education: Go to Patient Education Activity  Goal: Patient/Family Education  Outcome: Progressing Towards Goal

## 2020-11-01 NOTE — PROGRESS NOTES
Problem: Falls - Risk of  Goal: *Absence of Falls  Description: Document Kayla Hartman Fall Risk and appropriate interventions in the flowsheet.   Outcome: Progressing Towards Goal  Note: Fall Risk Interventions:                                Problem: Patient Education: Go to Patient Education Activity  Goal: Patient/Family Education  Outcome: Progressing Towards Goal

## 2020-11-01 NOTE — DISCHARGE SUMMARY
Physician Discharge Summary       Patient: Estevan Salinas MRN: [id number]     YOB: 1980  Age: 45 y.o. Sex: male    PCP: None    Allergies: Patient has no known allergies.     Admit date: 10/29/2020  Admitting Provider: Neelima Nix MD    Discharge date: 11/1/2020  Discharging Provider: Neelima Nix MD    * Admission Diagnoses:   Acute on chronic pancreatitis ([facility] [K85.90, K86.1]  Pancreatitis [K85.90]      * Discharge Diagnoses:    Hospital Problems as of 11/1/2020 Never Reviewed          Codes Class Noted - Resolved POA    Pancreatitis ICD-10-CM: K85.90  ICD-9-CM: 222.7  10/30/2020 - Present Unknown        Acute on chronic pancreatitis ([facility] ICD-10-CM: K85.90, K86.1  ICD-9-CM: 577.0, 577.1  10/29/2020 - Present Unknown        Alcohol abuse ICD-10-CM: F10.10  ICD-9-CM: 305.00  10/29/2020 - Present Unknown                * Hospital Course:   Patient is a 67-year-old man with history of chronic alcoholic pancreatitis who presents to emergency department today with complaints of abdominal pain.  He describes the pain as tenderness to the right upper quadrant which started this morning.  The patient reports that he had several bottles of beer yesterday evening while hanging out with his friends. Servando Delarosa first presented to the emergency department this morning for abdominal pain and due to elevated lipase level  he was advised to stay for pain management and monitoring but the patient decided to leave.  When he got home his pain persisted so he returned back to the emergency department this evening.  CT of abdomen showed no evidence of renal disease or bladder calculi but did show resolution of previously visualized pancreatitis and no other acute abdominal abnormalities.  Urine drug screen was negative and lipase level elevated at 517.  He will be placed in observation overnight for IV rehydration, pain management and bowel rest.    1.  Acute on chronic alcoholic pancreatitis  Secondary to alcohol abuse   Lipase level is 517  CT of abdomen shows no acute abdominal findings  -given  1000 mL normal saline bolus and multivitamin infusion thereafter.  - N.p.o. with ice chips for bowel rest   - Zofran as needed for nausea,  -Toradol 30 g IV  -GI consulted and recommendations appreciated  -10/30: Patient lipase level increased from [id number] continues to have significant pain with morphine helping but only for short duration stated Toradol helped better so we will continue Toradol 30 g IV every 6 hours scheduled and continue morphine as needed continue n.p.o. change IV fluids to normal saline with 20 meq KCl at 150 cc an hour  -10/31: Lipase level continues to increase from 2182 now to 2654 and patient continues to have pain we will continue scheduled Toradol and change morphine to Dilaudid 1 mg every 3 hours as needed and will change IV fluids to LR at 200 cc an hour  - 11/1 patient still requiring IV Dilaudid, patient's lipase level improved to 1055 patient states on evaluation that he feels better she has only mild pain so started patient on a clear liquid diet tolerated well and advance to a full liquid diet for lunch. Repeat lipase levels prior to discharge was on downward trend to 825        2.  Alcohol abuse  - He has been counseled about importance of complete abstinence  - Start CIWA protocol and start multivitamin infusion (Banana bag) x 1  -Continue oral multivitamin, thiamine, folate  - no signs of withdrawls during inpatient stay. Patient has been told risks of recurrent issues with pancreatitis with increased alcohol use.         3.  Nicotine dependence  - He has been counseled about importance of cessation  -Continue nicotine patch on discharge      4. Hypokalemia  -Patient potassium level was 3.8 on admission decreased to 3.4 we will add 20 meq oral KCl to IV fluids to run at 150 cc an hour  -10/31 potassium has normalized at 3.8.   DC KCl in IV fluids and start LR  - 11/1 still slightly low at 3.4 so was given 40meq kcl oral x 1 now   - will improve with advancing PO intake. 5.  Hypomagnesemia  -Magnesium level mildly low at 1.8 will give 2 g IV magnesium sulfate rider   -10/30 1 repeat magnesium level is at 1.7 so we will give additional 2 g IV magnesium sulfate rider and repeat mag level on 11/1 is 1.8 so on discharge will continue mag oxide 400mg oral daily       * Procedures:   * No surgery found *        GI consult on 10/30/2020  Assessment:      1. Acute pancreatitis  -Secondary to alcohol abuse. 2.  Alcohol abuse     Plan:      1. Bowel rest.  Keep n.p.o. except for ice chips. 2.  Vigorous IV hydration  3. Control pain with IV analgesics until patient is able to tolerate oral  4. Antiemetic as needed  5. Continue to monitor lipase levels daily. 6.  Patient was counseled on the importance of him stopping use of all alcohol.        Subjective:      Reason for consultation: Acute pancreatitis     History: 28-year-old male with history of cryptitis and chronic alcohol abuse who comes in with an episode of acute pancreatitis. Patient states his symptoms started yesterday morning with severe epigastric abdominal pain and nausea and vomiting. The patient states he had been drinking beer and liquor the previous night. Patient states he also had a complaint diarrhea at that time. Patient states this is his third episode of pancreatitis, related to his alcohol use. Patient presented to the emergency room with plans to admit him however patient initially refused and went home. Again had intractable nausea and vomiting so he returned to the emergency room and was subsequently admitted. He had a CT scan of the abdomen which showed improving evidence of pancreatitis from prior scan.         Vitals Last 24 Hours:  Patient Vitals for the past 24 hrs:   Temp Pulse Resp BP SpO2   11/01/20 1151 99.5 °F (37.5 °C) 97 18 (!) 141/89 100 %   11/01/20 0755 98.3 °F (36.8 °C) (!) 106 18 135/80 98 % 11/01/20 0410 98.2 °F (36.8 °C) 71 20 (!) 150/90 98 %   11/01/20 0001 99 °F (37.2 °C) 88 20 (!) 147/83 98 %   10/31/20 2000 100 °F (37.8 °C) 91 20 (!) 150/92 99 %   10/31/20 1616 98.9 °F (37.2 °C) (!) 103 18 (!) 139/92 96 %        Discharge Exam:  General: Alert, cooperative, no distress, appears stated age. Head:  Normocephalic, without obvious abnormality, atraumatic. Eyes:  Conjunctivae/corneas clear. Pupils equal, round, reactive to light. Extraocular movements intact. Lungs:  Clear to auscultation bilaterally. no wheeze, rales, crackles, rhonchi   Chest wall: No tenderness or deformity. Heart:  Regular rate and rhythm, S1, S2 normal, no murmur, click, rub or gallop. Abdomen:  Soft, minimal epigastric. Bowel sounds normal. No masses,  No organomegaly. Extremities: Extremities normal, atraumatic, no cyanosis or edema. Pulses: 2+ and symmetric all extremities. Skin: Skin color, texture, turgor normal. No rashes or lesions  Neurologic: Awake, Alert, oriented. No obvious gross sensory or motor deficits    Labs:  Recent Results (from the past 24 hour(s))   LIPASE    Collection Time: 11/01/20  6:30 AM   Result Value Ref Range    Lipase 1,055 (H) 73 - 393 U/L   CBC WITH AUTOMATED DIFF    Collection Time: 11/01/20  6:30 AM   Result Value Ref Range    WBC 7.5 4.4 - 11.3 K/uL    RBC 3.91 (L) 4.50 - 5.90 M/uL    HGB 12.7 (L) 13.5 - 17.5 g/dL    HCT 36.5 (L) 41 - 53 %    MCV 93.4 80 - 100 FL    MCH 32.4 31 - 34 PG    MCHC 34.7 31.0 - 36.0 g/dL    RDW 13.1 11.5 - 14.5 %    PLATELET 651 K/uL    MPV 8.0 6.5 - 11.5 FL    NRBC 0.0  WBC    ABSOLUTE NRBC 0.00 K/uL    NEUTROPHILS 75 42 - 75 %    LYMPHOCYTES 13 (L) 20.5 - 51.1 %    MONOCYTES 9 1.7 - 9.3 %    EOSINOPHILS 3 (H) 0.9 - 2.9 %    BASOPHILS 0 0.0 - 2.5 %    ABS. NEUTROPHILS 5.5 1.8 - 7.7 K/UL    ABS. LYMPHOCYTES 1.0 1.0 - 4.8 K/UL    ABS. MONOCYTES 0.7 0.2 - 2.4 K/UL    ABS. EOSINOPHILS 0.3 0.0 - 0.7 K/UL    ABS.  BASOPHILS 0.0 0.0 - 0.2 K/UL   METABOLIC PANEL, BASIC    Collection Time: 11/01/20  6:30 AM   Result Value Ref Range    Sodium 131 (L) 136 - 145 mmol/L    Potassium 3.4 (L) 3.5 - 5.1 mmol/L    Chloride 95 (L) 97 - 108 mmol/L    CO2 27 21 - 32 mmol/L    Anion gap 9 5 - 15 mmol/L    Glucose 93 65 - 100 mg/dL    BUN 8 6 - 20 mg/dL    Creatinine 0.81 0.70 - 1.30 mg/dL    BUN/Creatinine ratio 10 (L) 12 - 20      GFR est AA >60 >60 ml/min/1.73m2    GFR est non-AA >60 >60 ml/min/1.73m2    Calcium 8.4 (L) 8.5 - 10.1 mg/dL   MAGNESIUM    Collection Time: 11/01/20  6:30 AM   Result Value Ref Range    Magnesium 1.8 1.6 - 2.4 mg/dL         Imaging:  Ct Abd Pelv Wo Cont    Result Date: 10/29/2020  Impression: No evidence of renal, ureteral or bladder calculi. No acute appendicitis. Resolution of previously visualized pancreatitis. No acute abdominal or pelvic process identified. Please see full report. Ct Abd Pelv W Cont    Result Date: 10/31/2020  IMPRESSION: Acute pancreatitis. Surveillance recommended. Xr Chest Port    Result Date: 10/29/2020  Impression: The cardiomediastinal silhouette is appropriate for age, technique, and lung expansion. Pulmonary vasculature is not congested. The lungs are essentially clear. No effusion or pneumothorax is seen. * Discharge Condition: improved  * Disposition: Home w/Family      Discharge Medications:  Current Discharge Medication List      START taking these medications    Details   amLODIPine (NORVASC) 5 mg tablet Take 1 Tab by mouth daily. Qty: 30 Tab, Refills: 0      magnesium oxide (MAG-OX) 400 mg tablet Take 1 Tab by mouth daily. Qty: 30 Tab, Refills: 0      nicotine (NICODERM CQ) 14 mg/24 hr patch 1 Patch by TransDERmal route daily for 30 days. Qty: 30 Patch, Refills: 0      traMADoL (Ultram) 50 mg tablet Take 1 Tab by mouth every six (6) hours as needed for Pain for up to 3 days. Max Daily Amount: 200 mg.   Qty: 12 Tab, Refills: 0    Associated Diagnoses: Acute on chronic pancreatitis ([address] CONTINUE these medications which have NOT CHANGED    Details   ondansetron (Zofran ODT) 4 mg disintegrating tablet Take 1 Tab by mouth every eight (8) hours as needed for Nausea or Nausea or Vomiting for up to 2 days. Qty: 6 Tab, Refills: 0               * Follow-up Care/Patient Instructions: Activity: Activity as tolerated  Diet: Full liquids today and advance to bland soft diet in next 24 - 48 hrs. - No pending test at time of discharge-   -If recurrence of any symptoms come back to emergency room for reevaluation  -Counseled on alcohol cessation and given nicotine patch for tobacco use        Follow-up Information     Follow up With Specialties Details Why Contact Info    Estephanie Elder MD Internal Medicine On 11/9/2020 @ 11:30 [address]  [phone]      None    None (395) Patient stated that they have no PCP          Spent 35 minutes evaluting and coordinating patient care of which >50% was spent coordinating and counseling.      Signed:  Kenny Arnold MD  11/1/2020  1:16 PM

## 2020-11-01 NOTE — PROGRESS NOTES
[address] November 1, 2020       RE: Gustavo Alaniz      To Whom It May Concern,    This is to certify that Gustavo Alaniz was a patient at [facility] from 10/29 to 11/1/2020 and may return to work on 11/3 or sooner if patient symptoms continue to improve. Please feel free to contact my office if you have any questions or concerns. Thank you for your assistance in this matter.       Sincerely,  Meenakshi Mehta MD

## 2020-11-01 NOTE — DISCHARGE INSTRUCTIONS
Patient Education   Activity as tolerated  Full liquids today advance as tolerated  Return to ER in symptoms return or condition worsens     Learning About Alcohol Use Disorder  What is alcohol use disorder? Alcohol use disorder means that a person drinks alcohol even though it causes harm to themselves or others. It can range from mild to severe. The more signs of this disorder you have, the more severe it may be. Moderate to severe alcohol use disorder is sometimes called addiction. People who have it may find it hard to control their use of alcohol. People who have this disorder may argue with others about how much they're drinking. Their job may be affected because of drinking. They may drink when it's dangerous or illegal, such as when they drive. They also may have a strong need, or craving, to drink. They may feel like they must drink just to get by. Their drinking may increase their risk of getting hurt or being in a car crash. Over time, drinking too much alcohol may cause health problems. These may include high blood pressure, liver problems, or problems with digestion. What are the signs? Maybe you've wondered about your alcohol habits, or how to tell if your drinking is becoming a problem. Here are some of the signs of alcohol use disorder. You may have it if you have two or more of the following signs:  · You drink larger amounts of alcohol than you ever meant to. Or you've been drinking for a longer time than you ever meant to. · You can't cut down or control your use. Or you constantly wish you could cut down. · You spend a lot of time getting or drinking alcohol or recovering from its effects. · You have strong cravings for alcohol. · You can no longer do your main jobs at work, at school, or at home. · You keep drinking alcohol, even though your use hurts your relationships. · You have stopped doing important activities because of your alcohol use.   · You drink alcohol in situations where doing so is dangerous. · You keep drinking alcohol even though you know it's causing health problems. · You need more and more alcohol to get the same effect, or you get less effect from the same amount over time. This is called tolerance. · You have uncomfortable symptoms when you stop drinking alcohol or use less. This is called withdrawal.  Alcohol use disorder can range from mild to severe. The more signs you have, the more severe the disorder may be. Moderate to severe alcohol use disorder is sometimes called addiction. You might not realize that your drinking is a problem. You might not drink large amounts when you drink. Or you might go for days or weeks between drinking episodes. But even if you don't drink very often, your drinking could still be harmful and put you at risk. How is alcohol use disorder treated? Getting help is up to you. But you don't have to do it alone. There are many people and kinds of treatments that can help. Treatment for alcohol use disorder can include:  · Group therapy, one or more types of counseling, and alcohol education. · Medicines that help to:  ? Reduce withdrawal symptoms and help you safely stop drinking. ? Reduce cravings for alcohol. · Support groups. These groups include Alcoholics Anonymous and [employer] (Self-Management and Recovery Training). Some people are able to stop or cut back on drinking with help from a counselor. People who have moderate to severe alcohol use disorder may need medical treatment. They may need to stay in a hospital or treatment center. You may have a treatment team to help you. This team may include a psychologist or psychiatrist, counselors, doctors, social workers, nurses, and a . A  helps plan and manage your treatment. Follow-up care is a key part of your treatment and safety. Be sure to make and go to all appointments, and call your doctor if you are having problems.  It's also a good idea to know your test results and keep a list of the medicines you take. Where can you learn more? Go to http://www.gray.com/  Enter H758 in the search box to learn more about \"Learning About Alcohol Use Disorder. \"  Current as of: June 29, 2020               Content Version: 12.6  © 5559-8992 [employer]. Care instructions adapted under license by [employer] (which disclaims liability or warranty for this information). If you have questions about a medical condition or this instruction, always ask your healthcare professional. [address] any warranty or liability for your use of this information. Patient Education        Learning About Acute Pancreatitis  What is acute pancreatitis? The pancreas is an organ behind the stomach. It makes hormones like insulin that help control how your body uses sugar. It also makes enzymes that help you digest food. Usually these enzymes flow from the pancreas to the intestines. But if they leak into the pancreas, they can irritate it and cause pain and swelling. When this happens suddenly, it's called acute pancreatitis. What causes it? Most of the time, acute pancreatitis is caused by gallstones or by heavy alcohol use. But several other things can cause it, such as:  · High levels of fats in the blood. · An injury. · A problem after a surgery or a procedure. · Certain medicines. What are the symptoms? The main symptom is pain in the upper belly. The pain can be severe. You also may have a fever, nausea, or vomiting. Some people get so sick that they have problems breathing. They also may have low blood pressure. How is it diagnosed? Your doctor will diagnose pancreatitis with an exam and by looking at your lab tests. Your doctor may think that you have this problem based on your symptoms and where you have pain in your belly.   You may have blood tests of enzymes called amylase and lipase. In pancreatitis, the level of these enzymes is usually much higher than normal.  You also may have imaging tests of the belly. These may include an ultrasound, a CT scan, or an MRI. A special MRI called MRCP can show images of the bile ducts. This test can be very helpful when gallstones are causing the problem. How is it treated? Treatment of acute pancreatitis usually takes place in the hospital. It focuses on taking care of pain and supporting your body while your pancreas heals. In severe cases, treatment may occur in an intensive care unit to support breathing, treat serious infections, or help raise very low blood pressure. If a gallstone is causing the problem, the gallstone may need to be removed. This is done during a procedure called ERCP. The doctor puts a scope in your mouth and moves it gently through the stomach and into the ducts from the pancreas and gallbladder. The doctor is then able to see a stone and remove it. Sometimes the gallbladder, which makes gallstones, needs to be removed by surgery. People with pancreatitis often need a lot of fluid to help support their other organs and their blood pressure. They get fluids through a vein (intravenous, or IV). Instead of food by mouth, nutrition is sometimes given through a vein while the pancreas heals. Follow-up care is a key part of your treatment and safety. Be sure to make and go to all appointments, and call your doctor if you are having problems. It's also a good idea to know your test results and keep a list of the medicines you take. Where can you learn more? Go to http://www.gray.com/  Enter G541 in the search box to learn more about \"Learning About Acute Pancreatitis. \"  Current as of: April 15, 2020               Content Version: 12.6  © 5806-7107 [employer]. Care instructions adapted under license by [employer] (which disclaims liability or warranty for this information). If you have questions about a medical condition or this instruction, always ask your healthcare professional. [address] any warranty or liability for your use of this information. Patient Education        Diet for Chronic Pancreatitis: Care Instructions  Your Care Instructions     The pancreas is an organ behind the stomach that makes hormones and enzymes to help your body digest food. Sometimes the enzymes attack another part of the pancreas, which can cause pain and swelling. This is called pancreatitis. Chronic pancreatitis may cause you to be in pain much of the time. You may be able to help the pain by avoiding alcohol and eating a low-fat diet. Your doctor and dietitian can help you make an eating plan that does not irritate your digestive system. Always talk with your doctor or dietitian before you make changes in your diet. Follow-up care is a key part of your treatment and safety. Be sure to make and go to all appointments, and call your doctor if you are having problems. It's also a good idea to know your test results and keep a list of the medicines you take. How can you care for yourself at home? · Do not drink alcohol. It may make your pain worse and cause other problems. Tell your doctor if you need help to quit. Counseling, support groups, and sometimes medicines can help you stay sober. · Ask your doctor if you need to take pancreatic enzyme pills to help your body digest fat and protein. · Drink plenty of fluids, enough so that your urine is light yellow or clear like water. If you have kidney, heart, or liver disease and have to limit fluids, talk with your doctor before you increase the amount of fluids you drink. Eat a low-fat diet   · Eat many small meals and snacks each day instead of three large meals. · Choose lean meats. ? Eat no more than 5 to 6½ ounces of meat a day. ? Cut off all fat you can see. ?  Eat chicken and turkey without the skin.  ? Many types of fish, such as salmon, lake trout, tuna, and herring, provide healthy omega-3 fat. But avoid fish canned in oil, such as sardines in olive oil. ? Bake, broil, or grill meats, poultry, or fish instead of frying them in butter or fat. · Drink or eat nonfat or low-fat milk, yogurt, cheese, or other milk products each day. ? Read the labels on cheeses, and choose those with less than 5 grams of fat an ounce. ? Try fat-free sour cream, cream cheese, or yogurt. ? Avoid cream soups and cream sauces on pasta. ? Eat low-fat ice cream, frozen yogurt, or sorbet. Avoid regular ice cream.  · Eat whole-grain cereals, breads, crackers, rice, or pasta. Avoid high-fat foods such as croissants, scones, biscuits, waffles, doughnuts, muffins, granola, and high-fat breads. · Flavor your foods with herbs and spices (such as basil, tarragon, or mint), fat-free sauces, or lemon juice instead of butter. You can also use butter substitutes, fat-free mayonnaise, or fat-free dressing. · Try applesauce, prune puree, or mashed bananas to replace some or all of the fat when you bake. · Limit fats and oils, such as butter, margarine, mayonnaise, and salad dressing, to no more than 1 tablespoon a meal.  · Avoid high-fat foods, such as:  ? Chocolate, whole milk, ice cream, processed cheese, and egg yolks. ? Fried or buttered foods. ? Sausage, salami, and lomeli. ? Cinnamon rolls, cakes, pies, cookies, and other pastries. ? Prepared snack foods, such as potato chips, nut and granola bars, and mixed nuts. ? Coconut and avocado. · Learn how to read food labels for serving sizes and ingredients. Fast-food and convenience-food meals often have lots of fat. Where can you learn more? Go to http://www.gray.com/  Enter B849 in the search box to learn more about \"Diet for Chronic Pancreatitis: Care Instructions. \"  Current as of: August 22, 2019               Content Version: 12.6  © 6639-1772 [employer]. Care instructions adapted under license by [employer] (which disclaims liability or warranty for this information). If you have questions about a medical condition or this instruction, always ask your healthcare professional. [address] any warranty or liability for your use of this information.

## 2021-02-28 NOTE — ED NOTES
Discharge instructions discussed with pt. Pt understood pain management and follow up care. No questions at this time. Pt ready for discharge. Vicenta

## 2022-03-18 PROBLEM — K85.90 ACUTE ON CHRONIC PANCREATITIS (HCC): Status: ACTIVE | Noted: 2020-10-29

## 2022-03-18 PROBLEM — K86.1 ACUTE ON CHRONIC PANCREATITIS (HCC): Status: ACTIVE | Noted: 2020-10-29

## 2022-03-19 PROBLEM — K85.90 PANCREATITIS: Status: ACTIVE | Noted: 2020-10-30

## 2022-03-19 PROBLEM — F10.10 ALCOHOL ABUSE: Status: ACTIVE | Noted: 2020-10-29

## 2023-03-01 ENCOUNTER — HOSPITAL ENCOUNTER (EMERGENCY)
Age: 43
Discharge: HOME | End: 2023-03-01
Attending: EMERGENCY MEDICINE
Payer: MEDICAID

## 2023-03-01 VITALS
RESPIRATION RATE: 18 BRPM | TEMPERATURE: 98.3 F | BODY MASS INDEX: 21.37 KG/M2 | SYSTOLIC BLOOD PRESSURE: 107 MMHG | OXYGEN SATURATION: 97 % | HEART RATE: 98 BPM | DIASTOLIC BLOOD PRESSURE: 85 MMHG | WEIGHT: 162 LBS

## 2023-03-01 DIAGNOSIS — R73.9 HYPERGLYCEMIA: Primary | ICD-10-CM

## 2023-03-01 LAB
ALBUMIN SERPL-MCNC: 4 G/DL (ref 3.5–5.2)
ALBUMIN/GLOB SERPL: 1.5 (ref 1.1–2.2)
ALP SERPL-CCNC: 59 U/L (ref 40–129)
ALT SERPL-CCNC: 26 U/L (ref 10–50)
ANION GAP SERPL CALC-SCNC: 17 MMOL/L (ref 5–15)
APPEARANCE UR: CLEAR
AST SERPL-CCNC: 51 U/L (ref 10–50)
BACTERIA URNS QL MICRO: NEGATIVE /HPF
BASOPHILS # BLD: 0 K/UL (ref 0–1)
BASOPHILS NFR BLD: 1 % (ref 0–1)
BILIRUB SERPL-MCNC: 0.8 MG/DL (ref 0.2–1)
BILIRUB UR QL: NEGATIVE
BUN SERPL-MCNC: 11 MG/DL (ref 6–20)
BUN/CREAT SERPL: 18 (ref 12–20)
CALCIUM SERPL-MCNC: 9.1 MG/DL (ref 8.6–10)
CHLORIDE SERPL-SCNC: 93 MMOL/L (ref 98–107)
CO2 SERPL-SCNC: 20 MMOL/L (ref 22–29)
COLOR UR: ABNORMAL
COMMENT, HOLDF: NORMAL
CREAT SERPL-MCNC: 0.62 MG/DL (ref 0.7–1.2)
DIFFERENTIAL METHOD BLD: ABNORMAL
EOSINOPHIL # BLD: 0.1 K/UL (ref 0–0.4)
EOSINOPHIL NFR BLD: 1 % (ref 0–7)
EPITH CASTS URNS QL MICRO: ABNORMAL /LPF
ERYTHROCYTE [DISTWIDTH] IN BLOOD BY AUTOMATED COUNT: 12.9 % (ref 11.5–14.5)
GLOBULIN SER CALC-MCNC: 2.6 G/DL (ref 2–4)
GLUCOSE BLD STRIP.AUTO-MCNC: 344 MG/DL (ref 65–117)
GLUCOSE BLD STRIP.AUTO-MCNC: 412 MG/DL (ref 65–117)
GLUCOSE SERPL-MCNC: 447 MG/DL (ref 65–100)
GLUCOSE UR STRIP.AUTO-MCNC: >1000 MG/DL
HCT VFR BLD AUTO: 35.8 % (ref 36.6–50.3)
HGB BLD-MCNC: 12.1 G/DL (ref 12.1–17)
HGB UR QL STRIP: NEGATIVE
IMM GRANULOCYTES # BLD AUTO: 0 K/UL (ref 0–0.04)
IMM GRANULOCYTES NFR BLD AUTO: 0 % (ref 0–0.5)
KETONES UR QL STRIP.AUTO: 15 MG/DL
LEUKOCYTE ESTERASE UR QL STRIP.AUTO: NEGATIVE
LYMPHOCYTES # BLD: 2 K/UL (ref 0.8–3.5)
LYMPHOCYTES NFR BLD: 34 % (ref 12–49)
MCH RBC QN AUTO: 31.8 PG (ref 26–34)
MCHC RBC AUTO-ENTMCNC: 33.8 G/DL (ref 30–36.5)
MCV RBC AUTO: 94 FL (ref 80–99)
MONOCYTES # BLD: 0.4 K/UL (ref 0–1)
MONOCYTES NFR BLD: 7 % (ref 5–13)
NEUTS SEG # BLD: 3.3 K/UL (ref 1.8–8)
NEUTS SEG NFR BLD: 57 % (ref 32–75)
NITRITE UR QL STRIP.AUTO: NEGATIVE
NRBC # BLD: 0 K/UL (ref 0–0.01)
NRBC BLD-RTO: 0 PER 100 WBC
PH UR STRIP: 6 (ref 5–8)
PLATELET # BLD AUTO: 255 K/UL (ref 150–400)
PMV BLD AUTO: 10.3 FL (ref 8.9–12.9)
POTASSIUM SERPL-SCNC: 4.5 MMOL/L (ref 3.5–5.1)
PROT SERPL-MCNC: 6.6 G/DL (ref 6.4–8.3)
PROT UR STRIP-MCNC: NEGATIVE MG/DL
RBC # BLD AUTO: 3.81 M/UL (ref 4.1–5.7)
RBC #/AREA URNS HPF: ABNORMAL /HPF (ref 0–5)
SAMPLES BEING HELD,HOLD: NORMAL
SERVICE CMNT-IMP: ABNORMAL
SERVICE CMNT-IMP: ABNORMAL
SODIUM SERPL-SCNC: 130 MMOL/L (ref 136–145)
SP GR UR REFRACTOMETRY: 1.01 (ref 1–1.03)
UR CULT HOLD, URHOLD: NORMAL
UROBILINOGEN UR QL STRIP.AUTO: 0.2 EU/DL (ref 0.2–1)
WBC # BLD AUTO: 5.8 K/UL (ref 4.1–11.1)
WBC URNS QL MICRO: ABNORMAL /HPF (ref 0–4)

## 2023-03-01 PROCEDURE — 96374 THER/PROPH/DIAG INJ IV PUSH: CPT

## 2023-03-01 PROCEDURE — 80053 COMPREHEN METABOLIC PANEL: CPT

## 2023-03-01 PROCEDURE — 96375 TX/PRO/DX INJ NEW DRUG ADDON: CPT

## 2023-03-01 PROCEDURE — 36415 COLL VENOUS BLD VENIPUNCTURE: CPT

## 2023-03-01 PROCEDURE — 74011250636 HC RX REV CODE- 250/636: Performed by: EMERGENCY MEDICINE

## 2023-03-01 PROCEDURE — 96361 HYDRATE IV INFUSION ADD-ON: CPT

## 2023-03-01 PROCEDURE — 74011636637 HC RX REV CODE- 636/637: Performed by: EMERGENCY MEDICINE

## 2023-03-01 PROCEDURE — 81001 URINALYSIS AUTO W/SCOPE: CPT

## 2023-03-01 PROCEDURE — 85025 COMPLETE CBC W/AUTO DIFF WBC: CPT

## 2023-03-01 PROCEDURE — 99284 EMERGENCY DEPT VISIT MOD MDM: CPT

## 2023-03-01 RX ORDER — KETOROLAC TROMETHAMINE 30 MG/ML
30 INJECTION, SOLUTION INTRAMUSCULAR; INTRAVENOUS
Status: COMPLETED | OUTPATIENT
Start: 2023-03-01 | End: 2023-03-01

## 2023-03-01 RX ORDER — SODIUM CHLORIDE 0.9 % (FLUSH) 0.9 %
5-40 SYRINGE (ML) INJECTION AS NEEDED
Status: DISCONTINUED | OUTPATIENT
Start: 2023-03-01 | End: 2023-03-01 | Stop reason: HOSPADM

## 2023-03-01 RX ORDER — SODIUM CHLORIDE 0.9 % (FLUSH) 0.9 %
5-40 SYRINGE (ML) INJECTION EVERY 8 HOURS
Status: DISCONTINUED | OUTPATIENT
Start: 2023-03-01 | End: 2023-03-01 | Stop reason: HOSPADM

## 2023-03-01 RX ADMIN — SODIUM CHLORIDE 1000 ML: 9 INJECTION, SOLUTION INTRAVENOUS at 16:18

## 2023-03-01 RX ADMIN — KETOROLAC TROMETHAMINE 30 MG: 30 INJECTION, SOLUTION INTRAMUSCULAR; INTRAVENOUS at 16:18

## 2023-03-01 RX ADMIN — SODIUM CHLORIDE 1000 ML: 9 INJECTION, SOLUTION INTRAVENOUS at 15:04

## 2023-03-01 RX ADMIN — Medication 8 UNITS: at 15:43

## 2023-03-01 NOTE — ED PROVIDER NOTES
History of diabetes, pancreatitis. He presents accompanied by his girlfriend with complaints of elevated blood sugar. He states that it has been running greater than 600 for several weeks. He has been taking his Lantus (15 units/day) but not his NovoLog. He has been dealing with a tooth infection. He denies fever, nausea, vomiting. He has felt weak at times. He has had polyuria and polydipsia. Past Medical History:   Diagnosis Date    Pancreatitis        No past surgical history on file. No family history on file. Social History     Socioeconomic History    Marital status: SINGLE     Spouse name: Not on file    Number of children: Not on file    Years of education: Not on file    Highest education level: Not on file   Occupational History    Not on file   Tobacco Use    Smoking status: Every Day     Packs/day: 1.00     Types: Cigarettes    Smokeless tobacco: Never   Substance and Sexual Activity    Alcohol use: Yes     Alcohol/week: 3.0 standard drinks     Types: 3 Cans of beer per week    Drug use: Never    Sexual activity: Not on file   Other Topics Concern    Not on file   Social History Narrative    Not on file     Social Determinants of Health     Financial Resource Strain: Not on file   Food Insecurity: Not on file   Transportation Needs: Not on file   Physical Activity: Not on file   Stress: Not on file   Social Connections: Not on file   Intimate Partner Violence: Not on file   Housing Stability: Not on file         ALLERGIES: Patient has no known allergies. Review of Systems   All other systems reviewed and are negative. There were no vitals filed for this visit. Physical Exam  Vitals and nursing note reviewed. Constitutional:       Appearance: He is well-developed. HENT:      Head: Normocephalic and atraumatic. Eyes:      Conjunctiva/sclera: Conjunctivae normal.   Neck:      Trachea: No tracheal deviation.    Cardiovascular:      Rate and Rhythm: Normal rate and regular rhythm. Heart sounds: Normal heart sounds. No murmur heard. No friction rub. No gallop. Pulmonary:      Effort: Pulmonary effort is normal.      Breath sounds: Normal breath sounds. Abdominal:      Palpations: Abdomen is soft. Tenderness: There is no abdominal tenderness. Musculoskeletal:         General: No deformity. Cervical back: Neck supple. Skin:     General: Skin is warm and dry. Neurological:      Mental Status: He is alert. Comments: oriented        Medical Decision Making  Amount and/or Complexity of Data Reviewed  Labs: ordered. Risk  OTC drugs. Prescription drug management. Procedures    Progress Note:  Results, treatment, and follow up plan have been discussed with patient/girlfriend. Questions were answered. Andrew Camacho MD  4:55 PM    Assessment/plan: Elevated blood sugars. History of diabetes. Reassuring appearance/exam with stable vital signs. He has not been taking his insulin as directed. Labs remarkable for mild ketones in his urine. Bicarb is 20 with a gap of 17. Initial blood sugar is in the 400's. He improved with insulin and IV fluids. I do not feel like he needs to be admitted. He looks well. Close follow-up with a PCP strongly encouraged. Return precautions.   Andrew Camacho MD  4:56 PM

## 2023-03-01 NOTE — ED TRIAGE NOTES
Pt arrives to the ED ambulatory and stable with c/o a blood sugar of >600 [id number].  Pt has hx of diabetes

## 2023-06-23 NOTE — ROUTINE PROCESS
Bedside shift change report given to Gilmar Masterson LPN by Darin Mata RN. Report included the following information SBAR. Statement Selected

## 2024-01-02 ENCOUNTER — HOSPITAL ENCOUNTER (EMERGENCY)
Facility: HOSPITAL | Age: 44
Discharge: HOME | End: 2024-01-02
Attending: EMERGENCY MEDICINE

## 2024-01-02 ENCOUNTER — APPOINTMENT (OUTPATIENT)
Facility: HOSPITAL | Age: 44
End: 2024-01-02

## 2024-01-02 VITALS
RESPIRATION RATE: 16 BRPM | DIASTOLIC BLOOD PRESSURE: 71 MMHG | HEART RATE: 97 BPM | TEMPERATURE: 98.2 F | OXYGEN SATURATION: 97 % | HEIGHT: 73 IN | BODY MASS INDEX: 22.53 KG/M2 | SYSTOLIC BLOOD PRESSURE: 145 MMHG | WEIGHT: 170 LBS

## 2024-01-02 DIAGNOSIS — S69.92XA INJURY OF LEFT HAND, INITIAL ENCOUNTER: Primary | ICD-10-CM

## 2024-01-02 PROCEDURE — 90471 IMMUNIZATION ADMIN: CPT | Performed by: EMERGENCY MEDICINE

## 2024-01-02 PROCEDURE — 6360000002 HC RX W HCPCS: Performed by: EMERGENCY MEDICINE

## 2024-01-02 PROCEDURE — 99284 EMERGENCY DEPT VISIT MOD MDM: CPT

## 2024-01-02 PROCEDURE — 73130 X-RAY EXAM OF HAND: CPT

## 2024-01-02 PROCEDURE — 90714 TD VACC NO PRESV 7 YRS+ IM: CPT | Performed by: EMERGENCY MEDICINE

## 2024-01-02 RX ORDER — TETANUS AND DIPHTHERIA TOXOIDS ADSORBED 2; 2 [LF]/.5ML; [LF]/.5ML
0.5 INJECTION INTRAMUSCULAR
Status: COMPLETED | OUTPATIENT
Start: 2024-01-02 | End: 2024-01-02

## 2024-01-02 RX ADMIN — TETANUS AND DIPHTHERIA TOXOIDS ADSORBED 0.5 ML: 2; 2 INJECTION INTRAMUSCULAR at 15:38

## 2024-01-02 NOTE — ED TRIAGE NOTES
PT arrives with c/o L hand injury after a fall yesterday morning. PT reports that he got wood splinters in his hand and he is concerned because he is diabetic.

## 2024-01-02 NOTE — ED PROVIDER NOTES
[facility] EMERGENCY DEPT  EMERGENCY DEPARTMENT ENCOUNTER      Pt Name: Bertha Mitchell  MRN: [id number]  Birthdate 1980  Date of evaluation: 1/2/2024  Provider: Mateo Saldivar MD      HISTORY OF PRESENT ILLNESS      43-year-old male with history of diabetes, pancreatitis presents to the emergency department with concern for injury to his left hand.  He reports he fell yesterday and had multiple splinters in his left thumb, under the nail as well as in the thenar eminence.  These were pulled out by his mother.  He was concerned that he needed a tetanus shot.    The history is provided by the patient and medical records.           Nursing Notes were reviewed.    REVIEW OF SYSTEMS         Review of Systems        PAST MEDICAL HISTORY     Past Medical History:   Diagnosis Date    Diabetes (HCC)     Pancreatitis          SURGICAL HISTORY     History reviewed. No pertinent surgical history.      CURRENT MEDICATIONS       Previous Medications    AMLODIPINE (NORVASC) 5 MG TABLET    Take 5 mg by mouth daily    MAGNESIUM OXIDE (MAG-OX) 400 MG TABLET    Take 400 mg by mouth daily    ONDANSETRON (ZOFRAN-ODT) 4 MG DISINTEGRATING TABLET    Take 4 mg by mouth every 8 hours as needed       ALLERGIES     Patient has no known allergies.    FAMILY HISTORY     History reviewed. No pertinent family history.       SOCIAL HISTORY       Social History     Socioeconomic History    Marital status: Single     Spouse name: None    Number of children: None    Years of education: None    Highest education level: None   Tobacco Use    Smoking status: Every Day     Current packs/day: 1.00     Types: Cigarettes    Smokeless tobacco: Never   Substance and Sexual Activity    Alcohol use: Yes     Alcohol/week: 3.0 standard drinks of alcohol    Drug use: Never         PHYSICAL EXAM       ED Triage Vitals   BP Temp Temp Source Pulse Respirations SpO2 Height Weight - Scale   01/02/24 1410 01/02/24 1413 01/02/24 1413 01/02/24 1410 01/02/24 1410